# Patient Record
Sex: FEMALE | Race: WHITE | Employment: OTHER | URBAN - METROPOLITAN AREA
[De-identification: names, ages, dates, MRNs, and addresses within clinical notes are randomized per-mention and may not be internally consistent; named-entity substitution may affect disease eponyms.]

---

## 2017-06-19 ENCOUNTER — OFFICE VISIT (OUTPATIENT)
Dept: FAMILY MEDICINE | Facility: CLINIC | Age: 27
End: 2017-06-19
Payer: MEDICARE

## 2017-06-19 VITALS
SYSTOLIC BLOOD PRESSURE: 102 MMHG | TEMPERATURE: 97.5 F | WEIGHT: 157.5 LBS | BODY MASS INDEX: 26.89 KG/M2 | RESPIRATION RATE: 14 BRPM | HEART RATE: 93 BPM | OXYGEN SATURATION: 99 % | DIASTOLIC BLOOD PRESSURE: 62 MMHG | HEIGHT: 64 IN

## 2017-06-19 DIAGNOSIS — J35.1 SWOLLEN TONSIL: ICD-10-CM

## 2017-06-19 DIAGNOSIS — J03.01 ACUTE RECURRENT STREPTOCOCCAL TONSILLITIS: Primary | ICD-10-CM

## 2017-06-19 LAB
DEPRECATED S PYO AG THROAT QL EIA: ABNORMAL
MICRO REPORT STATUS: ABNORMAL
SPECIMEN SOURCE: ABNORMAL

## 2017-06-19 PROCEDURE — 99213 OFFICE O/P EST LOW 20 MIN: CPT | Performed by: FAMILY MEDICINE

## 2017-06-19 PROCEDURE — 87880 STREP A ASSAY W/OPTIC: CPT | Performed by: FAMILY MEDICINE

## 2017-06-19 RX ORDER — ACETAMINOPHEN AND CODEINE PHOSPHATE 300; 30 MG/1; MG/1
1-2 TABLET ORAL EVERY 4 HOURS PRN
Qty: 20 TABLET | Refills: 0 | Status: SHIPPED | OUTPATIENT
Start: 2017-06-19 | End: 2017-07-18

## 2017-06-19 RX ORDER — PENICILLIN V POTASSIUM 500 MG/1
500 TABLET, FILM COATED ORAL 3 TIMES DAILY
Qty: 30 TABLET | Refills: 0 | Status: SHIPPED | OUTPATIENT
Start: 2017-06-19 | End: 2017-07-18

## 2017-06-19 ASSESSMENT — PAIN SCALES - GENERAL: PAINLEVEL: NO PAIN (0)

## 2017-06-19 NOTE — MR AVS SNAPSHOT
"              After Visit Summary   6/19/2017    Sanam Morin    MRN: [id number]           Patient Information     Date Of Birth          1990        Visit Information        Provider Department      6/19/2017 2:30 PM Mayank Ríos MD [facility]        Today's Diagnoses     Acute recurrent streptococcal tonsillitis    -  1    Swollen tonsil           Follow-ups after your visit        Your next 10 appointments already scheduled     Jul 18, 2017  8:40 AM CDT   PHYSICAL with Mayank Ríos MD   [facility] ([facility])    [address] [place] [address] [id number] [phone]              Who to contact     If you have questions or need follow up information about today's clinic visit or your schedule please contact [facility] directly at [phone].  Normal or non-critical lab and imaging results will be communicated to you by MyChart, letter or phone within 4 business days after the clinic has received the results. If you do not hear from us within 7 days, please contact the clinic through MyChart or phone. If you have a critical or abnormal lab result, we will notify you by phone as soon as possible.  Submit refill requests through [employer] or call your pharmacy and they will forward the refill request to us. Please allow 3 business days for your refill to be completed.          Additional Information About Your Visit        MyChart Information     [employer] lets you send messages to your doctor, view your test results, renew your prescriptions, schedule appointments and more. To sign up, go to [email or web address] . Click on \"Log in\" on the left side of the screen, which will take you to the Welcome page. Then click on \"Sign up Now\" on the right side of the page.     You will be asked to enter the access code listed below, as well as some personal information. Please follow the directions to create your username and password.   " "  Your access code is: [email or web address]  Expires: 2017  6:52 PM     Your access code will  in 90 days. If you need help or a new code, please call your [facility] or [phone].        Care EveryWhere ID     This is your Care EveryWhere ID. This could be used by other organizations to access your [facility] medical records  [id number]        Your Vitals Were     Pulse Temperature Respirations Height Last Period Pulse Oximetry    93 97.5  F (36.4  C) (Tympanic) 14 5' 3.6\" (1.615 m) 06/15/2017 (Exact Date) 99%    BMI (Body Mass Index)                   27.38 kg/m2            Blood Pressure from Last 3 Encounters:   17 102/62   16 114/70   10/27/16 130/80    Weight from Last 3 Encounters:   17 157 lb 8 oz (71.4 kg)   16 148 lb (67.1 kg)   10/27/16 150 lb (68 kg)              We Performed the Following     Strep, Rapid Screen          Today's Medication Changes          These changes are accurate as of: 17  6:52 PM.  If you have any questions, ask your nurse or doctor.               Start taking these medicines.        Dose/Directions    acetaminophen-codeine 300-30 MG per tablet   Commonly known as:  TYLENOL/codeine #3   Used for:  Acute recurrent streptococcal tonsillitis   Started by:  Mayank Ríos MD        Dose:  1-2 tablet   Take 1-2 tablets by mouth every 4 hours as needed for mild pain   Quantity:  20 tablet   Refills:  0       penicillin V potassium 500 MG tablet   Commonly known as:  VEETID   Used for:  Acute recurrent streptococcal tonsillitis   Started by:  Mayank Ríos MD        Dose:  500 mg   Take 1 tablet (500 mg) by mouth 3 times daily   Quantity:  30 tablet   Refills:  0            Where to get your medicines      [facility] [address] [place] [address] [address]  [address] [facility] [address]     Phone:  [phone]     penicillin V potassium 500 MG " tablet         Some of these will need a paper prescription and others can be bought over the counter.  Ask your nurse if you have questions.     Bring a paper prescription for each of these medications     acetaminophen-codeine 300-30 MG per tablet                Primary Care Provider Office Phone # Fax #    NELLIE Sage [phone]       [facility]  [facility] [facility] [address] [place] [address]        Thank you!     Thank you for choosing [facility]  for your care. Our goal is always to provide you with excellent care. Hearing back from our patients is one way we can continue to improve our services. Please take a few minutes to complete the written survey that you may receive in the mail after your visit with us. Thank you!             Your Updated Medication List - Protect others around you: Learn how to safely use, store and throw away your medicines at www.disposemymeds.org.          This list is accurate as of: 6/19/17  6:52 PM.  Always use your most recent med list.                   Brand Name Dispense Instructions for use    acetaminophen-codeine 300-30 MG per tablet    TYLENOL/codeine #3    20 tablet    Take 1-2 tablets by mouth every 4 hours as needed for mild pain       LAMICTAL PO      Take 150 mg by mouth daily       penicillin V potassium 500 MG tablet    VEETID    30 tablet    Take 1 tablet (500 mg) by mouth 3 times daily

## 2017-06-19 NOTE — PROGRESS NOTES
SUBJECTIVE: 27 year old female with sore throat, myalgias, swollen glands, headache and fever for 2 days. No history of rheumatic fever. Other symptoms: none.    OBJECTIVE:   Vitals as noted above.  Appears mild distress.  Ears: normal  Oropharynx: tonsillar hypertrophy and marked erythema  Neck: normal, supple and moderate tender anterior cervical nodes  Lungs: chest clear to IPPA and clear to IPPA  Rapid Strep test is positive    ASSESSMENT: Streptococcal pharyngitis    PLAN: Per orders. Gargle, use acetaminophen or other OTC analgesic, and take Rx fully as prescribed. Call if other family members develop similar symptoms. See prn.       Mayank Ríos MD

## 2017-06-19 NOTE — LETTER
My Migraine Action Plan      Date: 6/19/2017     My Name: Sanam Morin   YOB: 1990  My Pharmacy:    [facility] - [place], [place] - [address] [id number] - [place] - [address]       My (Preventative) Control Medicine:         My Rescue Medicine:    My Doctor: Nusrat Rodriguez     My Clinic: [address] [address] [phone]        GREEN ZONE = Good Control    My headache plan is working.   I can do what I need to do.           I WILL:     ? Keep managing my triggers.  ? Write in my migraine diary each time I have a headache.  ? Keep taking my preventive (controller) medicine daily.  ? Take my relief and rescue medicine as needed.             YELLOW ZONE = Not Enough Control    My headache plan isn t always working.   My headaches keep me from doing   some of the things I need to do.       I WILL:     ? Set goals to control my triggers and act on them.  ? Write in my migraine diary each time I have a headache and review it for                      patterns or new triggers.  ? Keep taking my preventive (controller) medicine daily.  ? Take my relief and rescue medicine as needed.  ? Call my doctor or clinic at if I stay in the Yellow Zone.             RED ZONE = Poor or No Control    My headache plan has  failed. I can t do anything  when I have one. My  medicines aren t working.           I WILL:   ? Set goals to control my triggers and act on them.  ? Write in my migraine diary each time I have a headache and review it for                      patterns or new triggers.  ? Keep taking my preventive (controller) medicine daily.  ? Take my relief and rescue medicine as needed.  ? Call my doctor or clinic or go to urgent care or an ER if I m having the worst                  headache of my life.  ? Call my doctor or clinic or go to urgent care or an ER if my medicine  doesn t work.  ? Let my doctor or clinic know within 2 weeks if I have gone to an urgent care or             emergency department.          Provider specific instructions:

## 2017-06-19 NOTE — NURSING NOTE
"Chief Complaint   Patient presents with     Fever     x3d highest 102- only at night     Pharyngitis     x3d-     Mass     x3d swollen glands     Generalized Body Aches     all over body pain sigifredo- pain under rib cage very painful x3d. Feels bruised everywhere       Initial /62 (BP Location: Left arm, Patient Position: Chair, Cuff Size: Adult Regular)  Pulse 93  Temp 97.5  F (36.4  C) (Tympanic)  Resp 14  Ht 5' 3.6\" (1.615 m)  Wt 157 lb 8 oz (71.4 kg)  LMP 06/15/2017 (Exact Date)  SpO2 99%  BMI 27.38 kg/m2 Estimated body mass index is 27.38 kg/(m^2) as calculated from the following:    Height as of this encounter: 5' 3.6\" (1.615 m).    Weight as of this encounter: 157 lb 8 oz (71.4 kg).  Medication Reconciliation: complete   Health Maintenance Due   Topic Date Due     MIGRAINE ACTION PLAN  03/08/2006     PAP Q1 YR DIAGNOSTIC  02/05/2017     Radha Gonzalez, [facility]      "

## 2017-07-18 ENCOUNTER — OFFICE VISIT (OUTPATIENT)
Dept: FAMILY MEDICINE | Facility: CLINIC | Age: 27
End: 2017-07-18
Payer: MEDICARE

## 2017-07-18 VITALS
DIASTOLIC BLOOD PRESSURE: 80 MMHG | SYSTOLIC BLOOD PRESSURE: 126 MMHG | OXYGEN SATURATION: 99 % | BODY MASS INDEX: 27.2 KG/M2 | WEIGHT: 156.5 LBS | HEART RATE: 107 BPM | TEMPERATURE: 97.9 F | RESPIRATION RATE: 20 BRPM

## 2017-07-18 DIAGNOSIS — Z01.419 WELL FEMALE EXAM WITH ROUTINE GYNECOLOGICAL EXAM: Primary | ICD-10-CM

## 2017-07-18 DIAGNOSIS — J35.01 CHRONIC TONSILLITIS: ICD-10-CM

## 2017-07-18 LAB — HETEROPH AB SER QL: NEGATIVE

## 2017-07-18 PROCEDURE — 86308 HETEROPHILE ANTIBODY SCREEN: CPT | Performed by: FAMILY MEDICINE

## 2017-07-18 PROCEDURE — 99395 PREV VISIT EST AGE 18-39: CPT | Performed by: FAMILY MEDICINE

## 2017-07-18 PROCEDURE — G0145 SCR C/V CYTO,THINLAYER,RESCR: HCPCS | Performed by: FAMILY MEDICINE

## 2017-07-18 PROCEDURE — 36415 COLL VENOUS BLD VENIPUNCTURE: CPT | Performed by: FAMILY MEDICINE

## 2017-07-18 PROCEDURE — 99213 OFFICE O/P EST LOW 20 MIN: CPT | Mod: 25 | Performed by: FAMILY MEDICINE

## 2017-07-18 RX ORDER — METHYLPREDNISOLONE 4 MG
4 TABLET, DOSE PACK ORAL SEE ADMIN INSTRUCTIONS
Qty: 21 TABLET | Refills: 0 | Status: SHIPPED | OUTPATIENT
Start: 2017-07-18 | End: 2017-09-22

## 2017-07-18 ASSESSMENT — PAIN SCALES - GENERAL: PAINLEVEL: NO PAIN (0)

## 2017-07-18 NOTE — MR AVS SNAPSHOT
After Visit Summary   7/18/2017    Sanam Morin    MRN: [id number]           Patient Information     Date Of Birth          1990        Visit Information        Provider Department      7/18/2017 8:40 AM Mayank Ríos MD [facility]        Today's Diagnoses     Well female exam with routine gynecological exam    -  1    Chronic tonsillitis          Care Instructions      Preventive Health Recommendations  Female Ages 26 - 39  Yearly exam:   See your health care provider every year in order to    Review health changes.     Discuss preventive care.      Review your medicines if you your doctor has prescribed any.    Until age 30: Get a Pap test every three years (more often if you have had an abnormal result).    After age 30: Talk to your doctor about whether you should have a Pap test every 3 years or have a Pap test with HPV screening every 5 years.   You do not need a Pap test if your uterus was removed (hysterectomy) and you have not had cancer.  You should be tested each year for STDs (sexually transmitted diseases), if you're at risk.   Talk to your provider about how often to have your cholesterol checked.  If you are at risk for diabetes, you should have a diabetes test (fasting glucose).  Shots: Get a flu shot each year. Get a tetanus shot every 10 years.   Nutrition:     Eat at least 5 servings of fruits and vegetables each day.    Eat whole-grain bread, whole-wheat pasta and brown rice instead of white grains and rice.    Talk to your provider about Calcium and Vitamin D.     Lifestyle    Exercise at least 150 minutes a week (30 minutes a day, 5 days of the week). This will help you control your weight and prevent disease.    Limit alcohol to one drink per day.    No smoking.     Wear sunscreen to prevent skin cancer.    See your dentist every six months for an exam and cleaning.            Follow-ups after your visit        Who to contact     If you have  "questions or need follow up information about today's clinic visit or your schedule please contact [facility] directly at [phone].  Normal or non-critical lab and imaging results will be communicated to you by MyChart, letter or phone within 4 business days after the clinic has received the results. If you do not hear from us within 7 days, please contact the clinic through [employer] or phone. If you have a critical or abnormal lab result, we will notify you by phone as soon as possible.  Submit refill requests through [employer] or call your pharmacy and they will forward the refill request to us. Please allow 3 business days for your refill to be completed.          Additional Information About Your Visit        [employer] Information     [employer] lets you send messages to your doctor, view your test results, renew your prescriptions, schedule appointments and more. To sign up, go to [email or web address] . Click on \"Log in\" on the left side of the screen, which will take you to the Welcome page. Then click on \"Sign up Now\" on the right side of the page.     You will be asked to enter the access code listed below, as well as some personal information. Please follow the directions to create your username and password.     Your access code is: [email or web address]  Expires: 2017  6:52 PM     Your access code will  in 90 days. If you need help or a new code, please call your [facility] clinic or [phone].        Care EveryWhere ID     This is your Care EveryWhere ID. This could be used by other organizations to access your [facility] medical records  [id number]        Your Vitals Were     Pulse Temperature Respirations Last Period Pulse Oximetry BMI (Body Mass Index)    107 97.9  F (36.6  C) (Temporal) 20 06/15/2017 (Exact Date) 99% 27.2 kg/m2       Blood Pressure from Last 3 Encounters:   17 126/80   17 102/62   16 114/70    Weight from Last 3 Encounters:   17 156 lb 8 oz " (71 kg)   06/19/17 157 lb 8 oz (71.4 kg)   11/14/16 148 lb (67.1 kg)              We Performed the Following     Mononucleosis screen     Pap imaged thin layer screen reflex to HPV if ASCUS - recommend age 25 - 29          Today's Medication Changes          These changes are accurate as of: 7/18/17  1:58 PM.  If you have any questions, ask your nurse or doctor.               Start taking these medicines.        Dose/Directions    methylPREDNISolone 4 MG tablet   Commonly known as:  MEDROL DOSEPAK   Used for:  Chronic tonsillitis   Started by:  Mayank Ríos MD        Dose:  4 mg   Take 1 tablet (4 mg) by mouth See Admin Instructions   Quantity:  21 tablet   Refills:  0            Where to get your medicines      These medications were sent to [facility] [address] [place] [address] [address] [facility] [address]  [address] [facility] [address]     Phone:  [phone]     methylPREDNISolone 4 MG tablet                Primary Care Provider Office Phone # Fax #    Nusrat Rodriguez, NELLIE [facility] [phone]       [facility]  [facility]   [facility] [address]        Equal Access to Services     JOSE RAHMAN AH: Hadii karrie valderrama hadasho Soomaali, waaxda luqadaha, qaybta kaalmada adeegyada, catherine toussaint. So [facility] [phone].    ATENCIÓN: Si habla español, tiene a erickson disposición servicios gratuitos de asistencia lingüística. Llame al [phone].    We comply with applicable federal civil rights laws and [place] laws. We do not discriminate on the basis of race, color, national origin, age, disability sex, sexual orientation or gender identity.            Thank you!     Thank you for choosing [facility]  for your care. Our goal is always to provide you with excellent care. Hearing back from our patients is one way we can continue to improve our services. Please take a few minutes to complete the written  survey that you may receive in the mail after your visit with us. Thank you!             Your Updated Medication List - Protect others around you: Learn how to safely use, store and throw away your medicines at www.disposemymeds.org.          This list is accurate as of: 7/18/17  1:58 PM.  Always use your most recent med list.                   Brand Name Dispense Instructions for use Diagnosis    LAMICTAL PO      Take 150 mg by mouth daily    Episodic tension-type headache, not intractable       methylPREDNISolone 4 MG tablet    MEDROL DOSEPAK    21 tablet    Take 1 tablet (4 mg) by mouth See Admin Instructions    Chronic tonsillitis

## 2017-07-18 NOTE — LETTER
August 2, 2017      Sanam SEBASTIAN Mikel  [address] [address]   [facility] [address]    Dear ,      I am happy to inform you that your recent cervical cancer screening test (PAP smear) was normal.      Preventative screenings such as this help to ensure your health for years to come. You should repeat a pap smear in 2 years, unless otherwise directed.      You will still need to return to the clinic every year for your annual exam and other preventive tests.     Please contact the clinic at [phone] if you have further questions.       Sincerely,      Mayank Ríos MD/ericka
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
## 2017-07-19 ASSESSMENT — PATIENT HEALTH QUESTIONNAIRE - PHQ9: SUM OF ALL RESPONSES TO PHQ QUESTIONS 1-9: 21

## 2017-07-20 LAB
COPATH REPORT: NORMAL
PAP: NORMAL

## 2017-08-21 ENCOUNTER — TRANSFERRED RECORDS (OUTPATIENT)
Dept: HEALTH INFORMATION MANAGEMENT | Facility: CLINIC | Age: 27
End: 2017-08-21

## 2017-09-22 ENCOUNTER — OFFICE VISIT (OUTPATIENT)
Dept: URGENT CARE | Facility: RETAIL CLINIC | Age: 27
End: 2017-09-22
Payer: MEDICARE

## 2017-09-22 VITALS — DIASTOLIC BLOOD PRESSURE: 66 MMHG | SYSTOLIC BLOOD PRESSURE: 108 MMHG | HEART RATE: 85 BPM | TEMPERATURE: 97.9 F

## 2017-09-22 DIAGNOSIS — R30.0 DYSURIA: Primary | ICD-10-CM

## 2017-09-22 DIAGNOSIS — N39.0 ACUTE LOWER UTI: ICD-10-CM

## 2017-09-22 PROCEDURE — 99203 OFFICE O/P NEW LOW 30 MIN: CPT | Performed by: PHYSICIAN ASSISTANT

## 2017-09-22 PROCEDURE — 87088 URINE BACTERIA CULTURE: CPT | Performed by: PHYSICIAN ASSISTANT

## 2017-09-22 PROCEDURE — 87186 SC STD MICRODIL/AGAR DIL: CPT | Performed by: PHYSICIAN ASSISTANT

## 2017-09-22 PROCEDURE — 87086 URINE CULTURE/COLONY COUNT: CPT | Performed by: PHYSICIAN ASSISTANT

## 2017-09-22 RX ORDER — SULFAMETHOXAZOLE/TRIMETHOPRIM 800-160 MG
1 TABLET ORAL 2 TIMES DAILY
Qty: 6 TABLET | Refills: 0 | Status: SHIPPED | OUTPATIENT
Start: 2017-09-22 | End: 2017-09-25

## 2017-09-22 RX ORDER — HYDROXYZINE HYDROCHLORIDE 25 MG/1
TABLET, FILM COATED ORAL
Refills: 5 | COMMUNITY
Start: 2017-05-22 | End: 2018-10-03

## 2017-09-22 NOTE — MR AVS SNAPSHOT
"              After Visit Summary   9/22/2017    Sanam Morin    MRN: [id number]           Patient Information     Date Of Birth          1990        Visit Information        Provider Department      9/22/2017 9:30 AM Emilia Gutierrez [facility]-C [facility] [place]        Today's Diagnoses     Dysuria    -  1    Acute lower UTI          Care Instructions    Take full course of antibiotics- Bactrim twice daily for 3 days.  Urine culture pending, we will call you only if culture shows resistance and change of antibiotic is required or if no growth to stop antibiotics and to follow-up with your primary care provider.  May use over the counter Azo pain relief or Uristat for urinary burning but do not use for 24 hours before future urine tests.  Drink plenty of fluids. Limit caffeine and alcohol as these are bladder irritants.  May take tylenol or ibuprofen as needed for discomfort.   If you develop any vomiting, high fevers or lower back pain, these can be signs of a kidney infection and you should be seen in urgent care or in the ER.  Prevention of future infections by drinking cranberry juice, urination after intercourse and wiping from front to back after using the toilet.  Please follow up with primary care provider if symptoms return, if you're not improving, worsening or new symptoms or for any adverse reactions to medications.           Follow-ups after your visit        Who to contact     You can reach your care team any time of the day by calling [phone].  Notification of test results:  If you have an abnormal lab result, we will notify you by phone as soon as possible.         Additional Information About Your Visit        MyChart Information     Hyper9t lets you send messages to your doctor, view your test results, renew your prescriptions, schedule appointments and more. To sign up, go to [email or web address] . Click on \"Log in\" on the left side of the screen, which will take you " "to the Welcome page. Then click on \"Sign up Now\" on the right side of the page.     You will be asked to enter the access code listed below, as well as some personal information. Please follow the directions to create your username and password.     Your access code is: [email or web address]  Expires: 2017  9:57 AM     Your access code will  in 90 days. If you need help or a new code, please call your [facility] clinic or [phone].        Care EveryWhere ID     This is your Care EveryWhere ID. This could be used by other organizations to access your [facility] medical records  [id number]        Your Vitals Were     Pulse Temperature Last Period             85 97.9  F (36.6  C) (Oral) 2017          Blood Pressure from Last 3 Encounters:   17 108/66   17 126/80   17 102/62    Weight from Last 3 Encounters:   17 156 lb 8 oz (71 kg)   17 157 lb 8 oz (71.4 kg)   16 148 lb (67.1 kg)              We Performed the Following     BETA STREP GROUP A R/O CULTURE     RAPID STREP SCREEN          Today's Medication Changes          These changes are accurate as of: 17  9:57 AM.  If you have any questions, ask your nurse or doctor.               Start taking these medicines.        Dose/Directions    sulfamethoxazole-trimethoprim 800-160 MG per tablet   Commonly known as:  BACTRIM DS/SEPTRA DS   Used for:  Acute lower UTI        Dose:  1 tablet   Take 1 tablet by mouth 2 times daily for 3 days   Quantity:  6 tablet   Refills:  0            Where to get your medicines      These medications were sent to [facility] - [place], [place] - [address]  [address] [address]    Hours:  Formerly [place] - vic unchanged   03  Phone:  [phone]     sulfamethoxazole-trimethoprim 800-160 MG per tablet                Primary Care Provider Office Phone # Fax #    NELLIE Sage [phone]       [address] [place] [address]      "   Equal Access to Services     [facility]ium[facility]UL[facility] : Hadii aad ku hadviniciotaty Dennisetammy, wasuzanda luqadaha, qaybta kawestalejandra cortez, catherine toussaint. So [facility] [phone].    ATENCIÓN: Si habla español, tiene a erickson disposición servicios gratuitos de asistencia lingüística. Llame al [phone].    We comply with applicable federal civil rights laws and [place] laws. We do not discriminate on the basis of race, color, national origin, age, disability sex, sexual orientation or gender identity.            Thank you!     Thank you for choosing [facility]  for your care. Our goal is always to provide you with excellent care. Hearing back from our patients is one way we can continue to improve our services. Please take a few minutes to complete the written survey that you may receive in the mail after your visit with us. Thank you!             Your Updated Medication List - Protect others around you: Learn how to safely use, store and throw away your medicines at www.disposemymeds.org.          This list is accurate as of: 9/22/17  9:57 AM.  Always use your most recent med list.                   Brand Name Dispense Instructions for use Diagnosis    hydrOXYzine 25 MG tablet    ATARAX     TK 1 T PO TID PRN        LAMICTAL PO      Take 150 mg by mouth daily    Episodic tension-type headache, not intractable       methylPREDNISolone 4 MG tablet    MEDROL DOSEPAK    21 tablet    Take 1 tablet (4 mg) by mouth See Admin Instructions    Chronic tonsillitis       sulfamethoxazole-trimethoprim 800-160 MG per tablet    BACTRIM DS/SEPTRA DS    6 tablet    Take 1 tablet by mouth 2 times daily for 3 days    Acute lower UTI

## 2017-09-22 NOTE — PATIENT INSTRUCTIONS
Take full course of antibiotics- Bactrim twice daily for 3 days.  Urine culture pending, we will call you only if culture shows resistance and change of antibiotic is required or if no growth to stop antibiotics and to follow-up with your primary care provider.  May use over the counter Azo pain relief or Uristat for urinary burning but do not use for 24 hours before future urine tests.  Drink plenty of fluids. Limit caffeine and alcohol as these are bladder irritants.  May take tylenol or ibuprofen as needed for discomfort.   If you develop any vomiting, high fevers or lower back pain, these can be signs of a kidney infection and you should be seen in urgent care or in the ER.  Prevention of future infections by drinking cranberry juice, urination after intercourse and wiping from front to back after using the toilet.  Please follow up with primary care provider if symptoms return, if you're not improving, worsening or new symptoms or for any adverse reactions to medications.

## 2017-09-22 NOTE — PROGRESS NOTES
Chief Complaint   Patient presents with     UTI     4 days; woke up with symptoms Monday; Azo last night; pressure     SUBJECTIVE:   Sanam Morin is a 27 year old female who  presents today for a possible UTI.   She has symptoms of suprapubic pressure, dysuria, urgency and frequency that have been going on for 5 days.    Symptom timing described as gradual onset and moderate in severity.    This patient does have a history of urinary tract infections.  There is no history of hematuria, flank pain, fever, chills, nausea or vomiting.   Patient denies vaginal discharge, vaginal odor and vaginal itching   She took AZO pain relief last night which relieved her symptoms.  LMP: about Monday Sept 4, 2017.    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     Moderate major depression (H) 6/28/2011     Pap smear of cervix with ASCUS, cannot exclude HGSIL 8/20/13     Postpartum depression     major post partum depression.      S/P LASIK surgery      Current Outpatient Prescriptions   Medication Sig Dispense Refill     sulfamethoxazole-trimethoprim (BACTRIM DS/SEPTRA DS) 800-160 MG per tablet Take 1 tablet by mouth 2 times daily for 3 days 6 tablet 0     LamoTRIgine (LAMICTAL PO) Take 150 mg by mouth daily       hydrOXYzine (ATARAX) 25 MG tablet TK 1 T PO TID PRN  5     Social History   Substance Use Topics     Smoking status: Current Every Day Smoker     Packs/day: 1.00     Years: 11.00     Types: Cigarettes     Smokeless tobacco: Never Used     Alcohol use 0.6 - 1.2 oz/week     1 - 2 Glasses of wine per week     Allergies   Allergen Reactions     Moxifloxacin      diahreaa     Percocet [Oxycodone-Acetaminophen] GI Disturbance     Wellbutrin [Bupropion Hcl] Other (See Comments)     Suicidal thoughts     Yellow Dye      Number 5  Hives and diahreaa     Accutane  [Isotretinoin] Other (See Comments)     Suicidal thoughts  Suicidal ideation     Spironolactone Rash     ROS:   Review of systems negative except as stated above.    OBJECTIVE:  /66 (BP Location: Left arm)  Pulse 85  Temp 97.9  F (36.6  C) (Oral)  LMP 09/04/2017  GENERAL APPEARANCE: healthy, alert and no distress  RESP: lungs clear to auscultation - no rales, rhonchi or wheezes  CV: regular rates and rhythm, normal S1 S2, no murmur noted  ABDOMEN:  soft, mild suprapubic tenderness  BACK: No CVA tenderness    UA: not done because she has recently taken AZO pain relief and urine is bright orange    ASSESSMENT:    ICD-10-CM    1. Dysuria R30.0 Urine Culture Aerobic Bacterial     CANCELED: RAPID STREP SCREEN     CANCELED: BETA STREP GROUP A R/O CULTURE   2. Acute lower UTI N39.0 sulfamethoxazole-trimethoprim (BACTRIM DS/SEPTRA DS) 800-160 MG per tablet     PLAN:   Patient Instructions   Take full course of antibiotics- Bactrim twice daily for 3 days.  Urine culture pending, we will call you only if culture shows resistance and change of antibiotic is required or if no growth to stop antibiotics and to follow-up with your primary care provider.  May use over the counter Azo pain relief or Uristat for urinary burning but do not use for 24 hours before future urine tests.  Drink plenty of fluids. Limit caffeine and alcohol as these are bladder irritants.  May take tylenol or ibuprofen as needed for discomfort.   If you develop any vomiting, high fevers or lower back pain, these can be signs of a kidney infection and you should be seen in urgent care or in the ER.  Prevention of future infections by drinking cranberry juice, urination after intercourse and wiping from front to back after using the toilet.  Please follow up with primary care provider if symptoms return, if you're not improving, worsening or new symptoms or for any adverse reactions to medications.     Follow up with primary care provider with any  problems, questions or concerns or if symptoms worsen or fail to improve. Patient agreed to plan and verbalized understanding.    Ashleigh Gutierrez PA-C  [facility]

## 2017-09-22 NOTE — NURSING NOTE
"Chief Complaint   Patient presents with     UTI     4 days; woke up with symptoms Monday; Azo last night; pressure       Initial /66 (BP Location: Left arm)  Pulse 85  Temp 97.9  F (36.6  C) (Oral)  LMP 09/04/2017 Estimated body mass index is 27.2 kg/(m^2) as calculated from the following:    Height as of 6/19/17: 5' 3.6\" (1.615 m).    Weight as of 7/18/17: 156 lb 8 oz (71 kg).  Medication Reconciliation: complete  "

## 2017-09-23 NOTE — PROGRESS NOTES
Urine culture positive, preliminarily with 50,000-100,000 colonies/mL of lactose fermenting gram neg rods. Patient currently taking Bactrim. No change at this time. Awaiting final sensitivities. -Ashleigh Gutierrez PA-C

## 2017-09-24 LAB
BACTERIA SPEC CULT: ABNORMAL
SPECIMEN SOURCE: ABNORMAL

## 2017-09-24 NOTE — PROGRESS NOTES
Urine culture positive with 50,000-100,000 colonies/mL of E. coli. Bacteria is susceptible to Bactrim and patient is currently taking this. No change necessary. -Ashleigh Gutierrez PA-C

## 2017-09-26 ENCOUNTER — OFFICE VISIT (OUTPATIENT)
Dept: FAMILY MEDICINE | Facility: OTHER | Age: 27
End: 2017-09-26
Payer: MEDICARE

## 2017-09-26 VITALS
BODY MASS INDEX: 28.31 KG/M2 | DIASTOLIC BLOOD PRESSURE: 58 MMHG | RESPIRATION RATE: 12 BRPM | HEART RATE: 68 BPM | TEMPERATURE: 97.4 F | WEIGHT: 162.9 LBS | SYSTOLIC BLOOD PRESSURE: 100 MMHG | OXYGEN SATURATION: 100 %

## 2017-09-26 DIAGNOSIS — L05.91 CYST NEAR COCCYX: ICD-10-CM

## 2017-09-26 DIAGNOSIS — M53.3 PAIN IN THE COCCYX: Primary | ICD-10-CM

## 2017-09-26 PROCEDURE — 99213 OFFICE O/P EST LOW 20 MIN: CPT | Performed by: PHYSICIAN ASSISTANT

## 2017-09-26 ASSESSMENT — PAIN SCALES - GENERAL: PAINLEVEL: MILD PAIN (3)

## 2017-09-26 NOTE — MR AVS SNAPSHOT
After Visit Summary   9/26/2017    Sanam Morin    MRN: [id number]           Patient Information     Date Of Birth          1990        Visit Information        Provider Department      9/26/2017 8:20 AM Silvana Salcedo PA-C [facility]        Today's Diagnoses     Pain in the coccyx    -  1    Cyst near coccyx          Care Instructions    There is no evidence of acute redness or swelling to your skin, I will have you do sitz baths and if increased swelling or pain develop follow up with surgery for further evaluation and treatment.       Pilonidal Cyst    A pilonidal cyst is found near the base of the spine (tailbone) or top of the buttocks crease. It may look like a pit or small depression. In some cases, it may have a hollow tunnel (sinus tract) that connects it to the surface of the skin. Normally, a pilonidal cyst does not cause symptoms. But if it becomes infected, it can cause pain and swelling. This sheet tells you more about pilonidal cysts and how they are treated.  What causes a pilonidal cyst and who gets them?  Two main causes are:    Ingrown hairs. This happens when a hair is forced under the skin or when a hair follicle ruptures.    Injury to the area. This can happen from sitting for long periods of time.  These cysts are often diagnosed in people between ages 16 and 26. But people of any age can have a pilonidal cyst. They affect both men and women, but they are more common in men.  Symptoms of a pilonidal cyst infection  A pilonidal cyst does not cause symptoms unless it becomes infected. Once a pilonidal cyst becomes infected, it is called a pilonidal abscess. Infection may cause the following symptoms:    Pain, redness, and swelling of the cyst and area around it    Foul-smelling drainage from the cyst    Fever  Diagnosing a pilonidal cyst  A pilonidal cyst can be diagnosed by how it looks and by its location. Your healthcare provider will examine the  suspected cyst to confirm a diagnosis. You will be told if any tests are needed.  Treating a pilonidal cyst infection  Most pilonidal cysts are left alone. But if a cyst becomes infected, treatment is needed. It may include the following:    Incision and drainage. If needed, the cyst is cut open, and pus and other infected material is allowed to drain.    Antibiotic medicines for the infection. Know that medicines do not make the cyst go away, and antibiotics have limited use in treating an abscess. They also won t keep a cyst from becoming infected again.    Hot water soaks. These can help draw out the infection and ease pain and itching.    Surgery to remove the cyst (excision). This may be done if the infection is severe, does not respond to medicine, or keeps coming back. A surgeon cuts and removes the cyst and the tissue around it. Your healthcare provider can tell you more if this is needed.    Laser hair removalaround the area. This may decrease the frequency of flare-ups.  Preventing infection  A pilonidal cyst can easily become infected. Do the following to help prevent infections:    Keep the cyst and surrounding skin area clean.    Remove hair from the area of the cyst regularly. Ask your healthcare provider about safe hair removal products or procedures.    Avoid sitting in one position for long periods of time. This helps to reduce weight and pressure on your tailbone area. Sitting on a special cushion to relieve pressure on the tailbone may also help. Ask your healthcare provider about where to purchase these cushions.    Avoid tight-fitting clothing to reduce skin irritation around the cyst.  Date Last Reviewed: 2/1/2017 2000-2017 [employer] [address] [place] [address], [place] [address]. All rights reserved. This information is not intended as a substitute for professional medical care. Always follow your healthcare professional's instructions.                Follow-ups after your  visit        Additional Services     GENERAL SURG ADULT REFERRAL       Your provider has referred you to: [facility]: [facility] [phone]   [email or web address]  [facility]: [facility] [phone]   [email or web address]    Please be aware that coverage of these services is subject to the terms and limitations of your health insurance plan.  Call member services at your health plan with any benefit or coverage questions.      Please bring the following with you to your appointment:    (1) Any X-Rays, CTs or MRIs which have been performed.  Contact the facility where they were done to arrange for  prior to your scheduled appointment.   (2) List of current medications   (3) This referral request   (4) Any documents/labs given to you for this referral                  Follow-up notes from your care team     Return if symptoms worsen or fail to improve.      Who to contact     If you have questions or need follow up information about today's clinic visit or your schedule please contact [facility] directly at [phone].  Normal or non-critical lab and imaging results will be communicated to you by MyChart, letter or phone within 4 business days after the clinic has received the results. If you do not hear from us within 7 days, please contact the clinic through MyChart or phone. If you have a critical or abnormal lab result, we will notify you by phone as soon as possible.  Submit refill requests through sougou or call your pharmacy and they will forward the refill request to us. Please allow 3 business days for your refill to be completed.          Additional Information About Your Visit        sougou Information     sougou lets you send messages to your doctor, view your test results, renew your prescriptions, schedule appointments and more. To sign up, go to  "[email or web address] . Click on \"Log in\" on the left side of the screen, which will take you to the Welcome page. Then click on \"Sign up Now\" on the right side of the page.     You will be asked to enter the access code listed below, as well as some personal information. Please follow the directions to create your username and password.     Your access code is: [email or web address]  Expires: 2017  9:57 AM     Your access code will  in 90 days. If you need help or a new code, please call your [facility] clinic or [phone].        Care EveryWhere ID     This is your Care EveryWhere ID. This could be used by other organizations to access your [facility] medical records  [id number]        Your Vitals Were     Pulse Temperature Respirations Last Period Pulse Oximetry BMI (Body Mass Index)    68 97.4  F (36.3  C) (Oral) 12 2017 100% 28.31 kg/m2       Blood Pressure from Last 3 Encounters:   17 100/58   17 108/66   17 126/80    Weight from Last 3 Encounters:   17 162 lb 14.4 oz (73.9 kg)   17 156 lb 8 oz (71 kg)   17 157 lb 8 oz (71.4 kg)              We Performed the Following     GENERAL SURG ADULT REFERRAL        Primary Care Provider Office Phone # Fax #    NELLIE Sage [facility] [phone]       [address] [place] [address]        Equal Access to Services     JOSE RAHMAN : Hadbinta green Sotammy, waaxda luqadaha, qaybta kaalmada adeegyaalejandra, catherine toussaint. So [facility] [phone].    ATENCIÓN: Si habla español, tiene a erickson disposición servicios gratuitos de asistencia lingüística. Llame al [phone].    We comply with applicable federal civil rights laws and [place] laws. We do not discriminate on the basis of race, color, national origin, age, disability sex, sexual orientation or gender identity.            Thank you!     Thank you for choosing [facility]  for your care. Our goal is always " to provide you with excellent care. Hearing back from our patients is one way we can continue to improve our services. Please take a few minutes to complete the written survey that you may receive in the mail after your visit with us. Thank you!             Your Updated Medication List - Protect others around you: Learn how to safely use, store and throw away your medicines at www.disposemymeds.org.          This list is accurate as of: 9/26/17  8:49 AM.  Always use your most recent med list.                   Brand Name Dispense Instructions for use Diagnosis    hydrOXYzine 25 MG tablet    ATARAX     TK 1 T PO TID PRN        LAMICTAL PO      Take 150 mg by mouth daily    Episodic tension-type headache, not intractable

## 2017-09-26 NOTE — NURSING NOTE
"Chief Complaint   Patient presents with     Mass     check lump on buttocks,x 4 days       Initial /58 (BP Location: Left arm, Patient Position: Chair, Cuff Size: Adult Regular)  Pulse 68  Temp 97.4  F (36.3  C) (Oral)  Resp 12  Wt 162 lb 14.4 oz (73.9 kg)  LMP 09/04/2017  SpO2 100%  BMI 28.31 kg/m2 Estimated body mass index is 28.31 kg/(m^2) as calculated from the following:    Height as of 6/19/17: 5' 3.6\" (1.615 m).    Weight as of this encounter: 162 lb 14.4 oz (73.9 kg).  Medication Reconciliation: complete       Linda BOJORQUEZ LPN      "

## 2017-09-26 NOTE — PATIENT INSTRUCTIONS
There is no evidence of acute redness or swelling to your skin, I will have you do sitz baths and if increased swelling or pain develop follow up with surgery for further evaluation and treatment.       Pilonidal Cyst    A pilonidal cyst is found near the base of the spine (tailbone) or top of the buttocks crease. It may look like a pit or small depression. In some cases, it may have a hollow tunnel (sinus tract) that connects it to the surface of the skin. Normally, a pilonidal cyst does not cause symptoms. But if it becomes infected, it can cause pain and swelling. This sheet tells you more about pilonidal cysts and how they are treated.  What causes a pilonidal cyst and who gets them?  Two main causes are:    Ingrown hairs. This happens when a hair is forced under the skin or when a hair follicle ruptures.    Injury to the area. This can happen from sitting for long periods of time.  These cysts are often diagnosed in people between ages 16 and 26. But people of any age can have a pilonidal cyst. They affect both men and women, but they are more common in men.  Symptoms of a pilonidal cyst infection  A pilonidal cyst does not cause symptoms unless it becomes infected. Once a pilonidal cyst becomes infected, it is called a pilonidal abscess. Infection may cause the following symptoms:    Pain, redness, and swelling of the cyst and area around it    Foul-smelling drainage from the cyst    Fever  Diagnosing a pilonidal cyst  A pilonidal cyst can be diagnosed by how it looks and by its location. Your healthcare provider will examine the suspected cyst to confirm a diagnosis. You will be told if any tests are needed.  Treating a pilonidal cyst infection  Most pilonidal cysts are left alone. But if a cyst becomes infected, treatment is needed. It may include the following:    Incision and drainage. If needed, the cyst is cut open, and pus and other infected material is allowed to drain.    Antibiotic medicines for the  infection. Know that medicines do not make the cyst go away, and antibiotics have limited use in treating an abscess. They also won t keep a cyst from becoming infected again.    Hot water soaks. These can help draw out the infection and ease pain and itching.    Surgery to remove the cyst (excision). This may be done if the infection is severe, does not respond to medicine, or keeps coming back. A surgeon cuts and removes the cyst and the tissue around it. Your healthcare provider can tell you more if this is needed.    Laser hair removalaround the area. This may decrease the frequency of flare-ups.  Preventing infection  A pilonidal cyst can easily become infected. Do the following to help prevent infections:    Keep the cyst and surrounding skin area clean.    Remove hair from the area of the cyst regularly. Ask your healthcare provider about safe hair removal products or procedures.    Avoid sitting in one position for long periods of time. This helps to reduce weight and pressure on your tailbone area. Sitting on a special cushion to relieve pressure on the tailbone may also help. Ask your healthcare provider about where to purchase these cushions.    Avoid tight-fitting clothing to reduce skin irritation around the cyst.  Date Last Reviewed: 2/1/2017 2000-2017 [employer]. [address] [place] [address], [place] [address]. All rights reserved. This information is not intended as a substitute for professional medical care. Always follow your healthcare professional's instructions.

## 2017-10-11 NOTE — NURSING NOTE
"Chief Complaint   Patient presents with     Physical       Initial /80 (BP Location: Right arm, Patient Position: Chair, Cuff Size: Adult Regular)  Pulse 107  Temp 97.9  F (36.6  C) (Temporal)  Resp 20  Wt 156 lb 8 oz (71 kg)  LMP 06/15/2017 (Exact Date)  SpO2 99%  BMI 27.2 kg/m2 Estimated body mass index is 27.2 kg/(m^2) as calculated from the following:    Height as of 6/19/17: 5' 3.6\" (1.615 m).    Weight as of this encounter: 156 lb 8 oz (71 kg).  Medication Reconciliation: complete   Mariama Rodrigues CMA     " Patient not seen here since 8/2012.  Please advise on refill request.

## 2017-10-26 ENCOUNTER — OFFICE VISIT (OUTPATIENT)
Dept: URGENT CARE | Facility: RETAIL CLINIC | Age: 27
End: 2017-10-26
Payer: MEDICARE

## 2017-10-26 VITALS — HEART RATE: 80 BPM | TEMPERATURE: 99.2 F | DIASTOLIC BLOOD PRESSURE: 68 MMHG | SYSTOLIC BLOOD PRESSURE: 95 MMHG

## 2017-10-26 DIAGNOSIS — J02.9 ACUTE PHARYNGITIS, UNSPECIFIED ETIOLOGY: Primary | ICD-10-CM

## 2017-10-26 LAB — S PYO AG THROAT QL IA.RAPID: NORMAL

## 2017-10-26 PROCEDURE — 99213 OFFICE O/P EST LOW 20 MIN: CPT | Performed by: PHYSICIAN ASSISTANT

## 2017-10-26 PROCEDURE — 87081 CULTURE SCREEN ONLY: CPT | Performed by: PHYSICIAN ASSISTANT

## 2017-10-26 PROCEDURE — 87880 STREP A ASSAY W/OPTIC: CPT | Performed by: PHYSICIAN ASSISTANT

## 2017-10-26 NOTE — NURSING NOTE
"Chief Complaint   Patient presents with     Pharyngitis     since tuesday, low grade fever this am of 98.6 per patient     Otalgia     both ears felt watery tuesday, bilateral ear pain since yesterday       Initial BP 95/68 (BP Location: Left arm)  Pulse 80  Temp 99.2  F (37.3  C) (Temporal) Estimated body mass index is 28.31 kg/(m^2) as calculated from the following:    Height as of 6/19/17: 5' 3.6\" (1.615 m).    Weight as of 9/26/17: 162 lb 14.4 oz (73.9 kg).  Medication Reconciliation: complete    "

## 2017-10-26 NOTE — LETTER
[facility]  [address] [address]      October 26, 2017    Sanam SEBASTIAN Mikel  [address]   [facility] [address]        To whom it may concern:    Sanam was seen at our clinic today for an acute illness. Please excuse her from work today.        Sincerely,        Emilia Gutierrez PA-C

## 2017-10-26 NOTE — MR AVS SNAPSHOT
"              After Visit Summary   10/26/2017    Sanam Morin    MRN: [id number]           Patient Information     Date Of Birth          1990        Visit Information        Provider Department      10/26/2017 9:50 AM Emilia Gutierrez PA-C [facility]        Today's Diagnoses     Acute pharyngitis, unspecified etiology    -  1      Care Instructions    Rapid strep test today is negative.   Your throat culture is pending. [facility] will call if positive results to start antibiotics at that time; No call if the culture is negative.  Drink plenty of fluids and rest.  May use salt water gargles- about 8 oz warm water with about 1 teaspoon salt  Sucrets and Cepacol spray are over the counter medications that numb the throat.  Over the counter pain relievers such as tylenol or ibuprofen may be used as needed.   Honey lemon tea helps to soothe the throat. \"Throat Coat\" tea is soothing as well.  Please follow up with primary care provider if not improving, worsening or new symptoms.          Follow-ups after your visit        Who to contact     You can reach your care team any time of the day by calling [phone].  Notification of test results:  If you have an abnormal lab result, we will notify you by phone as soon as possible.         Additional Information About Your Visit        MyChart Information     [employer] lets you send messages to your doctor, view your test results, renew your prescriptions, schedule appointments and more. To sign up, go to [email or web address] . Click on \"Log in\" on the left side of the screen, which will take you to the Welcome page. Then click on \"Sign up Now\" on the right side of the page.     You will be asked to enter the access code listed below, as well as some personal information. Please follow the directions to create your username and password.     Your access code is: [email or web address]  Expires: 12/21/2017  9:57 AM     Your access code will "  in 90 days. If you need help or a new code, please call your [facility] clinic or [phone].        Care EveryWhere ID     This is your Care EveryWhere ID. This could be used by other organizations to access your [facility] medical records  [id number]        Your Vitals Were     Pulse Temperature                80 99.2  F (37.3  C) (Temporal)           Blood Pressure from Last 3 Encounters:   10/26/17 95/68   17 100/58   17 108/66    Weight from Last 3 Encounters:   17 162 lb 14.4 oz (73.9 kg)   17 156 lb 8 oz (71 kg)   17 157 lb 8 oz (71.4 kg)              We Performed the Following     BETA STREP GROUP A R/O CULTURE     RAPID STREP SCREEN        Primary Care Provider Office Phone # Fax #    Nusrat NELLIE Rodriguez [facility] [phone] [address] [place] [address]        Equal Access to Services     [facility]: Hadii aad ku hadasho Soomaali, waaxda luqadaha, qaybta kaalmada adeegyada, waxay idiin hayaan samanthaeg annettearakim james . So [facility] [phone].    ATENCIÓN: Si habla español, tiene a erickson disposición servicios gratuitos de asistencia lingüística. Llame al [phone].    We comply with applicable federal civil rights laws and [place] laws. We do not discriminate on the basis of race, color, national origin, age, disability, sex, sexual orientation, or gender identity.            Thank you!     Thank you for choosing [facility]  for your care. Our goal is always to provide you with excellent care. Hearing back from our patients is one way we can continue to improve our services. Please take a few minutes to complete the written survey that you may receive in the mail after your visit with us. Thank you!             Your Updated Medication List - Protect others around you: Learn how to safely use, store and throw away your medicines at www.disposemymeds.org.          This list is accurate as of: 10/26/17 10:26 AM.  Always use  your most recent med list.                   Brand Name Dispense Instructions for use Diagnosis    ALEVE PO           hydrOXYzine 25 MG tablet    ATARAX     TK 1 T PO TID PRN        LAMICTAL PO      Take 150 mg by mouth daily    Episodic tension-type headache, not intractable

## 2017-10-26 NOTE — PROGRESS NOTES
"Chief Complaint   Patient presents with     Pharyngitis     since tuesday, low grade fever this am of 98.6 per patient     Otalgia     both ears felt watery tuesday, bilateral ear pain since yesterday     SUBJECTIVE:  Sanam Morin is a 27 year old female presenting with a chief complaint of a sore throat.  Onset of symptoms was 2 days ago.  Course of illness: sudden onset.  Severity: mild and moderate  Current and Associated symptoms: bilateral ear fullness and pressure with a \"watery\" feeling, temp 99F  Treatment measures tried include: None tried.  Predisposing factors include: daughter not feeling well either- diagnosed with strep 9 days ago, on amoxicillin but still symptomatic.    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     Moderate major depression (H) 6/28/2011     Pap smear of cervix with ASCUS, cannot exclude HGSIL 8/20/13     Postpartum depression     major post partum depression.      S/P LASIK surgery      Current Outpatient Prescriptions   Medication Sig Dispense Refill     Naproxen Sodium (ALEVE PO)        LamoTRIgine (LAMICTAL PO) Take 150 mg by mouth daily       hydrOXYzine (ATARAX) 25 MG tablet TK 1 T PO TID PRN  5     Social History   Substance Use Topics     Smoking status: Current Every Day Smoker     Packs/day: 1.00     Years: 11.00     Types: Cigarettes     Smokeless tobacco: Never Used     Alcohol use 0.6 - 1.2 oz/week     1 - 2 Glasses of wine per week     Allergies   Allergen Reactions     Moxifloxacin      diahreaa     Percocet [Oxycodone-Acetaminophen] GI Disturbance     Wellbutrin [Bupropion Hcl] Other (See Comments)     Suicidal thoughts     Yellow Dye      Number 5  Hives and diahreaa     Accutane [Isotretinoin] Other (See Comments)     Suicidal thoughts  Suicidal ideation     Spironolactone Rash     ROS:  Review of " "systems negative except as stated above.    OBJECTIVE:   BP 95/68 (BP Location: Left arm)  Pulse 80  Temp 99.2  F (37.3  C) (Temporal)  GENERAL APPEARANCE: healthy, alert and in no distress  HEENT: Eyes PEERL, conjunctiva clear. Bilateral ear canals and TMs normal. Nose normal. Pharynx erythematous without tonsillar hypertrophy or exudate noted.  NECK: supple, non-tender to palpation, no adenopathy noted  RESP: lungs clear to auscultation - no rales, rhonchi or wheezes  CV: regular rates and rhythm, normal S1 S2, no murmur noted  SKIN: no suspicious lesions or rashes    Rapid Strep test is negative; await throat culture results.    ASSESSMENT:    ICD-10-CM    1. Acute pharyngitis, unspecified etiology J02.9 RAPID STREP SCREEN     BETA STREP GROUP A R/O CULTURE     PLAN:   Patient Instructions   Rapid strep test today is negative.   Your throat culture is pending. [facility] will call if positive results to start antibiotics at that time; No call if the culture is negative.  Drink plenty of fluids and rest.  May use salt water gargles- about 8 oz warm water with about 1 teaspoon salt  Sucrets and Cepacol spray are over the counter medications that numb the throat.  Over the counter pain relievers such as tylenol or ibuprofen may be used as needed.   Honey lemon tea helps to soothe the throat. \"Throat Coat\" tea is soothing as well.  Please follow up with primary care provider if not improving, worsening or new symptoms.    Follow up with primary care provider with any problems, questions or concerns or if symptoms worsen or fail to improve. Patient agreed to plan and verbalized understanding.    Ashleigh Gutierrez PA-C  [facility]  "

## 2017-10-28 LAB — BETA STREP CONFIRM: NORMAL

## 2018-04-04 ENCOUNTER — OFFICE VISIT (OUTPATIENT)
Dept: URGENT CARE | Facility: RETAIL CLINIC | Age: 28
End: 2018-04-04
Payer: MEDICARE

## 2018-04-04 VITALS
SYSTOLIC BLOOD PRESSURE: 124 MMHG | TEMPERATURE: 99 F | DIASTOLIC BLOOD PRESSURE: 77 MMHG | OXYGEN SATURATION: 97 % | HEART RATE: 91 BPM

## 2018-04-04 DIAGNOSIS — J02.9 ACUTE PHARYNGITIS, UNSPECIFIED ETIOLOGY: ICD-10-CM

## 2018-04-04 DIAGNOSIS — F17.200 TOBACCO USE DISORDER: ICD-10-CM

## 2018-04-04 DIAGNOSIS — J06.9 UPPER RESPIRATORY TRACT INFECTION, UNSPECIFIED TYPE: Primary | ICD-10-CM

## 2018-04-04 LAB — S PYO AG THROAT QL IA.RAPID: NORMAL

## 2018-04-04 PROCEDURE — 87081 CULTURE SCREEN ONLY: CPT | Performed by: PHYSICIAN ASSISTANT

## 2018-04-04 PROCEDURE — 87880 STREP A ASSAY W/OPTIC: CPT | Mod: QW | Performed by: PHYSICIAN ASSISTANT

## 2018-04-04 PROCEDURE — 99213 OFFICE O/P EST LOW 20 MIN: CPT | Performed by: PHYSICIAN ASSISTANT

## 2018-04-04 RX ORDER — SILICONE ADHESIVE 1.5" X 3"
SHEET (EA) TOPICAL
COMMUNITY

## 2018-04-04 NOTE — MR AVS SNAPSHOT
After Visit Summary   4/4/2018    Sanam Morin    MRN: [id number]           Patient Information     Date Of Birth          1990        Visit Information        Provider Department      4/4/2018 10:20 AM Stacey Ramírez PA-C [facility]        Today's Diagnoses     Acute pharyngitis, unspecified etiology    -  1    Upper respiratory tract infection, unspecified type        Tobacco use disorder          Care Instructions       * PHARYNGITIS (Sore Throat),REPORT PENDING    Pharyngitis (sore throat) is often due to a virus, but can also be caused by the  strep  bacteria. This is called  strep throat . Both viral and strep infection can cause throat pain that is worse when swallowing, aching all over with headache and fever. Both types of infections are contagious. They may be spread by coughing, kissing or touching others after touching your mouth or nose, so wash your hands often.  A test has been done to determine whether or not you have strep throat. If it is positive for strep infection you will usually need to take antibiotics. If the test is negative, you probably have a viral pharyngitis, and antibiotic treatment will not help you recover.  HOME CARE:    If your symptoms are severe, rest at home for the first 2-3 days. If you are told that your test is positive for strep, you should be off work and school for the first two days of antibiotic treatment. After that, you will no longer be as contagious.    Children: Use acetaminophen (Tylenol) for fever, fussiness or discomfort. In infants over six months of age, you may use ibuprofen (Children's Motrin) instead of Tylenol. [NOTE: If your child has chronic liver or kidney disease or ever had a stomach ulcer or GI bleeding, talk with your doctor before using these medicines.]   (Aspirin should never be used in anyone under 18 years of age who is ill with a fever. It may cause severe liver damage.)  Adults: You may use  acetaminophen (Tylenol) 650-1000 mg every 6 hours or ibuprofen (Motrin, Advil) 600 mg every 6-8 hours with food to control pain, if you are able to take these medicines. [NOTE: If you have chronic liver or kidney disease or ever had a stomach ulcer or GI bleeding, talk with your doctor before using these medicines.]    Throat lozenges or sprays (Chloraseptic and others), or gargling with warm salt water will reduce throat pain. Dissolve 1/2 teaspoon of salt in 1 glass of warm water. This is especially useful just before meals.     FOLLOW UP with your doctor as advised by our staff if you are not improving over the next week.  GET PROMPT MEDICAL ATTENTION  if any of the following occur:    Fever over 101 F (38.3 C) for more than three days    New or worsening ear pain, sinus pain or headache    Unable to swallow liquids or open your mouth wide due to throat pain    Trouble breathing    Muffled voice    New rash       [id number] [facility]. [address] [place] [address], [place] [address]. All rights reserved. This information is not intended as a substitute for professional medical care. Always follow your healthcare professional's instructions.  This information has been modified by your health care provider with permission from the publisher.    Throat culture pending - will be notified of positive results only.    Please FOLLOW UP at primary care clinic if not improving, new symptoms, worse or this does not resolve.  [facility]  [phone]            Follow-ups after your visit        Who to contact     You can reach your care team any time of the day by calling [phone].  Notification of test results:  If you have an abnormal lab result, we will notify you by phone as soon as possible.         Additional Information About Your Visit        MyChart Information     [employer] lets you send messages to your doctor, view your test results, renew your prescriptions, schedule appointments and  "more. To sign up, go to [email or web address] . Click on \"Log in\" on the left side of the screen, which will take you to the Welcome page. Then click on \"Sign up Now\" on the right side of the page.     You will be asked to enter the access code listed below, as well as some personal information. Please follow the directions to create your username and password.     Your access code is: [email or web address]  Expires: 7/3/2018 11:08 AM     Your access code will  in 90 days. If you need help or a new code, please call your [facility] clinic or [phone].        Care EveryWhere ID     This is your Care EveryWhere ID. This could be used by other organizations to access your [facility] medical records  [id number]        Your Vitals Were     Pulse Temperature Pulse Oximetry             91 99  F (37.2  C) (Oral) 97%          Blood Pressure from Last 3 Encounters:   18 124/77   10/26/17 95/68   17 100/58    Weight from Last 3 Encounters:   17 162 lb 14.4 oz (73.9 kg)   17 156 lb 8 oz (71 kg)   17 157 lb 8 oz (71.4 kg)              We Performed the Following     BETA STREP GROUP A R/O CULTURE     RAPID STREP SCREEN        Primary Care Provider Office Phone # Fax #    Nusrat Paola Rodriguez, [facility] [phone] [address] [place] [address]        Equal Access to Services     [facility]: Hadii aad ku hadasho Soomaali, waaxda luqadaha, qaybta kaalmada adeegyada, waxay elvira toussaint. So [facility] [phone].    ATENCIÓN: Si habla español, tiene a erickson disposición servicios gratuitos de asistencia lingüística. Llame al [phone].    We comply with applicable federal civil rights laws and [place] laws. We do not discriminate on the basis of race, color, national origin, age, disability, sex, sexual orientation, or gender identity.            Thank you!     Thank you for choosing [facility]  for your care. Our goal is always to " provide you with excellent care. Hearing back from our patients is one way we can continue to improve our services. Please take a few minutes to complete the written survey that you may receive in the mail after your visit with us. Thank you!             Your Updated Medication List - Protect others around you: Learn how to safely use, store and throw away your medicines at www.disposemymeds.org.          This list is accurate as of 4/4/18 11:10 AM.  Always use your most recent med list.                   Brand Name Dispense Instructions for use Diagnosis    ALEVE PO           hydrOXYzine 25 MG tablet    ATARAX     TK 1 T PO TID PRN        LAMICTAL PO      Take 150 mg by mouth daily    Episodic tension-type headache, not intractable       RYAN-e 400 MG Tabs

## 2018-04-04 NOTE — PROGRESS NOTES
Chief Complaint   Patient presents with     Pharyngitis     started today     Generalized Body Aches     x 3 days          SUBJECTIVE:   Pt. presenting to [facility] -  with a chief complaint of generalized malaise, some URI symptoms and now ST. Low grade temp. Wants to RO strep..   See CC.  Hx of asthma no.  Onset of symptoms few days  Course of illness is same.    Severity moderate  Current and Associated symptoms: runny nose, stuffy nose, sore throat, headache, body aches and fatigue  Treatment measures tried include Tylenol/Ibuprofen.  Predisposing factors include tobacco use.  Last antibiotic 2017  Bactrim     Pregnancy no  Smoker yes    ROS:  Afebrile now but feels feverish at times  Energy level is <  ENT - denies ear pain. Some nasal congestion  CP - no cough,SOB or chest pain   GI- - appetite <. No nausea, vomiting or diarrhea.   No bowel or bladder changes   MSK - no joint pain or swelling   Skin: No rashes    Past Medical History:   Diagnosis Date     Abnormal Pap smear     during pregnancy     H/O colposcopy with cervical biopsy 14    LSIL on biopsy     High risk HPV infection 14    papNIL/+ HR HPV 16.     Moderate major depression (H) 2011     Pap smear of cervix with ASCUS, cannot exclude HGSIL 13     Postpartum depression     major post partum depression.      S/P LASIK surgery      Past Surgical History:   Procedure Laterality Date      SECTION, TUBAL LIGATION, COMBINED  2014    Procedure:  section and bilateral tubal ligation;  Surgeon: Mayank Ríos MD;  Location:  L+D     CONIZATION N/A 10/6/2014    Procedure: CONIZATION;  Surgeon: Jas Johnson MD;  Location:  OR     DILATION AND CURETTAGE, HYSTEROSCOPY DIAGNOSTIC, COMBINED N/A 10/6/2014    Procedure: COMBINED DILATION AND CURETTAGE, HYSTEROSCOPY DIAGNOSTIC;  Surgeon: Jas Johnson MD;  Location:  OR     LASIK       MYRINGOTOMY, INSERT TUBE, COMBINED        Patient Active Problem List   Diagnosis     GERD (gastroesophageal reflux disease)     Tobacco use disorder     Obesity (BMI 30-39.9)     Insomnia     Bipolar affective disorder (H)     Acne     S/P LEEP (status post loop electrosurgical excision procedure)     CARDIOVASCULAR SCREENING; LDL GOAL LESS THAN 160     S/P      Anxiety     Dysplasia of cervix     Menorrhagia     Current Outpatient Prescriptions   Medication     S-Adenosylmethionine (RYAN-E) 400 MG TABS     Naproxen Sodium (ALEVE PO)     hydrOXYzine (ATARAX) 25 MG tablet     LamoTRIgine (LAMICTAL PO)     No current facility-administered medications for this visit.        OBJECTIVE:  /77  Pulse 91  Temp 99  F (37.2  C) (Oral)  SpO2 97%    GENERAL APPEARANCE: cooperative, alert and no distress. Appears well hydrated.  EYES: conjunctiva clear  HENT: Rt ear canal  clear and TM normal   Lt ear canal clear and TM normal   Nose some congestion. no discharge  Mouth without ulcers or lesions. mild erythema. no exudate. Some PND noted  NECK: supple, few small shoddy NT ant nodes. No  posterior nodes.  RESP: lungs clear to auscultation - no rales, rhonchi or wheezes. Breathing easily.  CV: regular rates and rhythm  ABDOMEN:  soft, nontender, no HSM or masses and bowel sounds normal   SKIN: no suspicious lesions or rashes  no tenderness to palpate over  sinus areas.    Rapid strep neg    ASSESSMENT:     Acute pharyngitis, unspecified etiology  Upper respiratory tract infection, unspecified type  Tobacco use disorder      PLAN:  Symptomatic measures   Throat culture pending - will be notified of positive results only.  Discussed with pt this appears to be a viral etiology and antibiotics do not help viral infections. If symptoms change, persist or increase then reevaluation is needed.  Salt water gargles  - throat lozenges or honey/lemon tea if soothing   saline nasal spray for  nasal congestion   Cool mist vaporizer.  Smoking discouraged -  Discussed > CV,CP and cancer risks with tobacco use.   Stay in clean air environment.  > rest.  > fluids.  Contagiousness and hygiene discussed.  Fever and pain  control measures discussed.   If unable to swallow or any breathing difficulty to go to ED   AVS given and discussed:  Patient Instructions      * PHARYNGITIS (Sore Throat),REPORT PENDING    Pharyngitis (sore throat) is often due to a virus, but can also be caused by the  strep  bacteria. This is called  strep throat . Both viral and strep infection can cause throat pain that is worse when swallowing, aching all over with headache and fever. Both types of infections are contagious. They may be spread by coughing, kissing or touching others after touching your mouth or nose, so wash your hands often.  A test has been done to determine whether or not you have strep throat. If it is positive for strep infection you will usually need to take antibiotics. If the test is negative, you probably have a viral pharyngitis, and antibiotic treatment will not help you recover.  HOME CARE:    If your symptoms are severe, rest at home for the first 2-3 days. If you are told that your test is positive for strep, you should be off work and school for the first two days of antibiotic treatment. After that, you will no longer be as contagious.    Children: Use acetaminophen (Tylenol) for fever, fussiness or discomfort. In infants over six months of age, you may use ibuprofen (Children's Motrin) instead of Tylenol. [NOTE: If your child has chronic liver or kidney disease or ever had a stomach ulcer or GI bleeding, talk with your doctor before using these medicines.]   (Aspirin should never be used in anyone under 18 years of age who is ill with a fever. It may cause severe liver damage.)  Adults: You may use acetaminophen (Tylenol) 650-1000 mg every 6 hours or ibuprofen (Motrin, Advil) 600 mg every 6-8 hours with food to control pain, if you are able to take these medicines.  [NOTE: If you have chronic liver or kidney disease or ever had a stomach ulcer or GI bleeding, talk with your doctor before using these medicines.]    Throat lozenges or sprays (Chloraseptic and others), or gargling with warm salt water will reduce throat pain. Dissolve 1/2 teaspoon of salt in 1 glass of warm water. This is especially useful just before meals.     FOLLOW UP with your doctor as advised by our staff if you are not improving over the next week.  GET PROMPT MEDICAL ATTENTION  if any of the following occur:    Fever over 101 F (38.3 C) for more than three days    New or worsening ear pain, sinus pain or headache    Unable to swallow liquids or open your mouth wide due to throat pain    Trouble breathing    Muffled voice    New rash       [id number] [employer]. [address] [place] [address], [place] [address]. All rights reserved. This information is not intended as a substitute for professional medical care. Always follow your healthcare professional's instructions.  This information has been modified by your health care provider with permission from the publisher.    Throat culture pending - will be notified of positive results only.    Please FOLLOW UP at primary care clinic if not improving, new symptoms, worse or this does not resolve.  [facility]  [phone]        See letter for work ([employer])  Pt is comfortable with this plan.  Electronically signed,  SARAH Ramírez PAC

## 2018-04-04 NOTE — NURSING NOTE
"Chief Complaint   Patient presents with     Pharyngitis     started today     Generalized Body Aches     x 3 days        Initial /77  Pulse 91  Temp 99  F (37.2  C) (Oral)  SpO2 97% Estimated body mass index is 28.31 kg/(m^2) as calculated from the following:    Height as of 6/19/17: 5' 3.6\" (1.615 m).    Weight as of 9/26/17: 162 lb 14.4 oz (73.9 kg).  Medication Reconciliation: complete   Gabrielle Gilliam      "

## 2018-04-04 NOTE — PATIENT INSTRUCTIONS
* PHARYNGITIS (Sore Throat),REPORT PENDING    Pharyngitis (sore throat) is often due to a virus, but can also be caused by the  strep  bacteria. This is called  strep throat . Both viral and strep infection can cause throat pain that is worse when swallowing, aching all over with headache and fever. Both types of infections are contagious. They may be spread by coughing, kissing or touching others after touching your mouth or nose, so wash your hands often.  A test has been done to determine whether or not you have strep throat. If it is positive for strep infection you will usually need to take antibiotics. If the test is negative, you probably have a viral pharyngitis, and antibiotic treatment will not help you recover.  HOME CARE:    If your symptoms are severe, rest at home for the first 2-3 days. If you are told that your test is positive for strep, you should be off work and school for the first two days of antibiotic treatment. After that, you will no longer be as contagious.    Children: Use acetaminophen (Tylenol) for fever, fussiness or discomfort. In infants over six months of age, you may use ibuprofen (Children's Motrin) instead of Tylenol. [NOTE: If your child has chronic liver or kidney disease or ever had a stomach ulcer or GI bleeding, talk with your doctor before using these medicines.]   (Aspirin should never be used in anyone under 18 years of age who is ill with a fever. It may cause severe liver damage.)  Adults: You may use acetaminophen (Tylenol) 650-1000 mg every 6 hours or ibuprofen (Motrin, Advil) 600 mg every 6-8 hours with food to control pain, if you are able to take these medicines. [NOTE: If you have chronic liver or kidney disease or ever had a stomach ulcer or GI bleeding, talk with your doctor before using these medicines.]    Throat lozenges or sprays (Chloraseptic and others), or gargling with warm salt water will reduce throat pain. Dissolve 1/2 teaspoon of salt in 1 glass of  warm water. This is especially useful just before meals.     FOLLOW UP with your doctor as advised by our staff if you are not improving over the next week.  GET PROMPT MEDICAL ATTENTION  if any of the following occur:    Fever over 101 F (38.3 C) for more than three days    New or worsening ear pain, sinus pain or headache    Unable to swallow liquids or open your mouth wide due to throat pain    Trouble breathing    Muffled voice    New rash       [id number] [employer]. [address] [place] [address]. All rights reserved. This information is not intended as a substitute for professional medical care. Always follow your healthcare professional's instructions.  This information has been modified by your health care provider with permission from the publisher.    Throat culture pending - will be notified of positive results only.    Please FOLLOW UP at primary care clinic if not improving, new symptoms, worse or this does not resolve.  [facility]  [phone]

## 2018-04-06 LAB
BACTERIA SPEC CULT: NORMAL
SPECIMEN SOURCE: NORMAL

## 2018-04-17 ENCOUNTER — OFFICE VISIT (OUTPATIENT)
Dept: URGENT CARE | Facility: RETAIL CLINIC | Age: 28
End: 2018-04-17
Payer: MEDICARE

## 2018-04-17 VITALS
TEMPERATURE: 98.8 F | SYSTOLIC BLOOD PRESSURE: 102 MMHG | OXYGEN SATURATION: 98 % | DIASTOLIC BLOOD PRESSURE: 71 MMHG | HEART RATE: 106 BPM

## 2018-04-17 DIAGNOSIS — J01.90 ACUTE SINUSITIS WITH SYMPTOMS > 10 DAYS: Primary | ICD-10-CM

## 2018-04-17 PROCEDURE — 99213 OFFICE O/P EST LOW 20 MIN: CPT | Performed by: PHYSICIAN ASSISTANT

## 2018-04-17 NOTE — PROGRESS NOTES
Chief Complaint   Patient presents with     Cough     since sunday, productive cough some times, no fevers     Sinus Problem     x 2 weeks, green mucus     SUBJECTIVE:  Sanam Morin is a 28 year old female here with concerns about sinus infection.  She states onset of symptoms was just over 2 weeks ago.    Course of illness is worsening.   Severity moderate  She has had maxillary pressure as well as nasal congestion, rhinorrhea, cough, sore throat, facial pain/pressure, tooth pain, headache, post-nasal drainage and nausea.  Predisposing factors include tobacco abuse.  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     Moderate major depression (H) 6/28/2011     Pap smear of cervix with ASCUS, cannot exclude HGSIL 8/20/13     Postpartum depression     major post partum depression.      S/P LASIK surgery      Current Outpatient Prescriptions   Medication Sig Dispense Refill     GuaiFENesin (COUGH SYRUP PO)        amoxicillin-clavulanate (AUGMENTIN) 875-125 MG per tablet Take 1 tablet by mouth 2 times daily for 10 days 20 tablet 0     S-Adenosylmethionine (RYAN-E) 400 MG TABS        Naproxen Sodium (ALEVE PO)        hydrOXYzine (ATARAX) 25 MG tablet TK 1 T PO TID PRN  5     LamoTRIgine (LAMICTAL PO) Take 150 mg by mouth daily       Social History   Substance Use Topics     Smoking status: Current Every Day Smoker     Packs/day: 1.00     Years: 11.00     Types: Cigarettes     Smokeless tobacco: Never Used     Alcohol use 0.6 - 1.2 oz/week     1 - 2 Glasses of wine per week     Allergies   Allergen Reactions     Moxifloxacin      diahreaa     Percocet [Oxycodone-Acetaminophen] GI Disturbance     Wellbutrin [Bupropion Hcl] Other (See Comments)     Suicidal thoughts     Yellow Dye      Number 5  Hives  and diahreaa     Accutane [Isotretinoin] Other (See Comments)     Suicidal thoughts  Suicidal ideation     Spironolactone Rash     ROS:  Review of systems negative except as stated above.    OBJECTIVE:  /71 (BP Location: Left arm)  Pulse 106  Temp 98.8  F (37.1  C) (Tympanic)  SpO2 98%  GENERAL APPEARANCE: healthy, alert and no distress  EYES: PERRL, conjunctiva clear  HENT: Pain with palpation over frontal and maxillary sinuses. Ear canals normal TMs with mild serous effusions bilaterally. Nasal turbinates edematous with clear and yellow discharge bilaterally. Posterior pharynx is not erythematous.  NECK: supple, nontender, no lymphadenopathy  RESP: lungs clear to auscultation - no rales, rhonchi or wheezes  CV: regular rates and rhythm, normal S1 S2, no murmur noted    ASSESSMENT:    ICD-10-CM    1. Acute sinusitis with symptoms > 10 days J01.90 amoxicillin-clavulanate (AUGMENTIN) 875-125 MG per tablet     PLAN:   Patient Instructions   Augmentin (amoxicillin-clavulanate) 875mg twice daily for 10 days as directed.  Flonase 2 sprays in each nostril daily until symptoms resolve, then continue 1 spray in each nostril for at least 5 more days.  Take Tylenol or an NSAID such as ibuprofen or naproxen as needed for pain.  May use netti pot with bottled or distilled water and saline packets to flush sinuses.  Sudafed (pseudoephedrine) behind the pharmacist counter for 3-5 days helps relieve congestion.  Afrin (oxymetazoline) nasal spray twice daily for 3 days. Stop after 3 days.  Mucinex (guiafenesin) thins mucus and may help it to loosen more quickly  Saline drops or nasal sprays may loosen mucus.  Sit in the bathroom with the door closed and hot shower running to loosen mucus.  Contact primary care clinic if you do not have any relief from your symptoms after 10 days.  Present to emergency room for significantly increasing pain, persistent high fever >102F, swelling/redness around your eyes, changes in your  vision or ability to move your eyes, altered mental status or a severe headache.    Follow up with primary care provider with any problems, questions or concerns or if symptoms worsen or fail to improve. Patient agreed to plan and verbalized understanding.    Ashleigh Gutierrez PA-C  [facility]

## 2018-04-17 NOTE — MR AVS SNAPSHOT
After Visit Summary   4/17/2018    Sanam Morin    MRN: [id number]           Patient Information     Date Of Birth          1990        Visit Information        Provider Department      4/17/2018 12:10 PM Emilia Gutierrez PA-C [facility]        Today's Diagnoses     Acute sinusitis with symptoms > 10 days    -  1      Care Instructions    Augmentin (amoxicillin-clavulanate) 875mg twice daily for 10 days as directed.  Flonase 2 sprays in each nostril daily until symptoms resolve, then continue 1 spray in each nostril for at least 5 more days.  Take Tylenol or an NSAID such as ibuprofen or naproxen as needed for pain.  May use netti pot with bottled or distilled water and saline packets to flush sinuses.  Sudafed (pseudoephedrine) behind the pharmacist counter for 3-5 days helps relieve congestion.  Afrin (oxymetazoline) nasal spray twice daily for 3 days. Stop after 3 days.  Mucinex (guiafenesin) thins mucus and may help it to loosen more quickly  Saline drops or nasal sprays may loosen mucus.  Sit in the bathroom with the door closed and hot shower running to loosen mucus.  Contact primary care clinic if you do not have any relief from your symptoms after 10 days.  Present to emergency room for significantly increasing pain, persistent high fever >102F, swelling/redness around your eyes, changes in your vision or ability to move your eyes, altered mental status or a severe headache.          Follow-ups after your visit        Your next 10 appointments already scheduled     Apr 20, 2018 10:00 AM CDT   Office Visit with [facility]    [address] [place] [id number] [phone]           Bring a current list of meds and any records pertaining to this visit. For Physicals, please bring immunization records and any forms needing to be filled out. Please arrive 10 minutes early to  "complete paperwork.              Who to contact     You can reach your care team any time of the day by calling [phone].  Notification of test results:  If you have an abnormal lab result, we will notify you by phone as soon as possible.         Additional Information About Your Visit        MyChart Information     [employer] lets you send messages to your doctor, view your test results, renew your prescriptions, schedule appointments and more. To sign up, go to [email or web address] . Click on \"Log in\" on the left side of the screen, which will take you to the Welcome page. Then click on \"Sign up Now\" on the right side of the page.     You will be asked to enter the access code listed below, as well as some personal information. Please follow the directions to create your username and password.     Your access code is: [email or web address]  Expires: 7/3/2018 11:08 AM     Your access code will  in 90 days. If you need help or a new code, please call your [facility] clinic or [phone].        Care EveryWhere ID     This is your Care EveryWhere ID. This could be used by other organizations to access your [facility] medical records  [id number]        Your Vitals Were     Pulse Temperature Pulse Oximetry             106 98.8  F (37.1  C) (Tympanic) 98%          Blood Pressure from Last 3 Encounters:   18 102/71   18 124/77   10/26/17 95/68    Weight from Last 3 Encounters:   17 162 lb 14.4 oz (73.9 kg)   17 156 lb 8 oz (71 kg)   17 157 lb 8 oz (71.4 kg)              Today, you had the following     No orders found for display         Today's Medication Changes          These changes are accurate as of 18 12:27 PM.  If you have any questions, ask your nurse or doctor.               Start taking these medicines.        Dose/Directions    amoxicillin-clavulanate 875-125 MG per tablet   Commonly known as:  AUGMENTIN   Used for:  Acute sinusitis with symptoms > 10 days        Dose:  1 " tablet   Take 1 tablet by mouth 2 times daily for 10 days   Quantity:  20 tablet   Refills:  0            Where to get your medicines      These medications were sent to [id number] - [place], [place] - [address]  [address], [facility] [address]    Hours:  [facility] [place] - numbers unchanged   9/8/03  Phone:  [phone]     amoxicillin-clavulanate 875-125 MG per tablet                Primary Care Provider Office Phone # Fax #    Nusrat Guevara Michael, APRN [facility] [phone]       [address] [place] [address]        Equal Access to Services     [facility]: Hadii aad ku hadasho Soomaali, waaxda luqadaha, qaybta kaalmada adeegyada, catherine felix haymargy james . So [facility] [phone].    ATENCIÓN: Si habla español, tiene a erickson disposición servicios gratuitos de asistencia lingüística. [facility] [phone].    We comply with applicable federal civil rights laws and [place] laws. We do not discriminate on the basis of race, color, national origin, age, disability, sex, sexual orientation, or gender identity.            Thank you!     Thank you for choosing [facility]  for your care. Our goal is always to provide you with excellent care. Hearing back from our patients is one way we can continue to improve our services. Please take a few minutes to complete the written survey that you may receive in the mail after your visit with us. Thank you!             Your Updated Medication List - Protect others around you: Learn how to safely use, store and throw away your medicines at www.disposemymeds.org.          This list is accurate as of 4/17/18 12:27 PM.  Always use your most recent med list.                   Brand Name Dispense Instructions for use Diagnosis    ALEVE PO           amoxicillin-clavulanate 875-125 MG per tablet    AUGMENTIN    20 tablet    Take 1 tablet by mouth 2 times daily for 10 days    Acute sinusitis with symptoms > 10 days       COUGH SYRUP  PO           hydrOXYzine 25 MG tablet    ATARAX     TK 1 T PO TID PRN        LAMICTAL PO      Take 150 mg by mouth daily    Episodic tension-type headache, not intractable       RYAN-e 400 MG Tabs

## 2018-04-17 NOTE — PATIENT INSTRUCTIONS
Augmentin (amoxicillin-clavulanate) 875mg twice daily for 10 days as directed.  Flonase 2 sprays in each nostril daily until symptoms resolve, then continue 1 spray in each nostril for at least 5 more days.  Take Tylenol or an NSAID such as ibuprofen or naproxen as needed for pain.  May use netti pot with bottled or distilled water and saline packets to flush sinuses.  Sudafed (pseudoephedrine) behind the pharmacist counter for 3-5 days helps relieve congestion.  Afrin (oxymetazoline) nasal spray twice daily for 3 days. Stop after 3 days.  Mucinex (guiafenesin) thins mucus and may help it to loosen more quickly  Saline drops or nasal sprays may loosen mucus.  Sit in the bathroom with the door closed and hot shower running to loosen mucus.  Contact primary care clinic if you do not have any relief from your symptoms after 10 days.  Present to emergency room for significantly increasing pain, persistent high fever >102F, swelling/redness around your eyes, changes in your vision or ability to move your eyes, altered mental status or a severe headache.

## 2018-04-17 NOTE — NURSING NOTE
"Chief Complaint   Patient presents with     Cough     since sunday, productive cough some times, no fevers     Sinus Problem     x 2 weeks, green mucus       Initial /71 (BP Location: Left arm)  Pulse 106  Temp 98.8  F (37.1  C) (Tympanic)  SpO2 98% Estimated body mass index is 28.31 kg/(m^2) as calculated from the following:    Height as of 6/19/17: 5' 3.6\" (1.615 m).    Weight as of 9/26/17: 162 lb 14.4 oz (73.9 kg).  Medication Reconciliation: complete    "

## 2018-04-20 ENCOUNTER — OFFICE VISIT (OUTPATIENT)
Dept: FAMILY MEDICINE | Facility: CLINIC | Age: 28
End: 2018-04-20
Payer: MEDICARE

## 2018-04-20 VITALS
TEMPERATURE: 97.6 F | HEIGHT: 64 IN | DIASTOLIC BLOOD PRESSURE: 80 MMHG | SYSTOLIC BLOOD PRESSURE: 126 MMHG | BODY MASS INDEX: 28.34 KG/M2 | HEART RATE: 100 BPM | WEIGHT: 166 LBS | OXYGEN SATURATION: 98 %

## 2018-04-20 DIAGNOSIS — R10.13 ABDOMINAL PAIN, EPIGASTRIC: Primary | ICD-10-CM

## 2018-04-20 PROCEDURE — 99214 OFFICE O/P EST MOD 30 MIN: CPT | Performed by: NURSE PRACTITIONER

## 2018-04-20 NOTE — MR AVS SNAPSHOT
After Visit Summary   4/20/2018    Sanam Morin    MRN: [id number]           Patient Information     Date Of Birth          1990        Visit Information        Provider Department      4/20/2018 10:00 AM Nusrat Rodriguez APRN CNP [facility]        Today's Diagnoses     Abdominal pain, epigastric    -  1       Follow-ups after your visit        Additional Services     GASTROENTEROLOGY ADULT REF PROCEDURE ONLY [facility] [phone]; No Provider Preference (ASAP)       Last Lab Result: Creatinine (mg/dL)       Date                     Value                 11/16/2015               0.76             ----------  Body mass index is 28.94 kg/(m^2).      Patient will be contacted to schedule procedure.     Please be aware that coverage of these services is subject to the terms and limitations of your health insurance plan.  Call member services at your health plan with any benefit or coverage questions.  Any procedures must be performed at a [place] facility OR coordinated by your clinic's referral office.    Please bring the following with you to your appointment:    (1) Any X-Rays, CTs or MRIs which have been performed.  Contact the facility where they were done to arrange for  prior to your scheduled appointment.    (2) List of current medications   (3) This referral request   (4) Any documents/labs given to you for this referral                  Future tests that were ordered for you today     Open Future Orders        Priority Expected Expires Ordered    HCG qualitative urine Routine  4/20/2019 4/20/2018            Who to contact     If you have questions or need follow up information about today's clinic visit or your schedule please contact [facility] directly at [phone].  Normal or non-critical lab and imaging results will be communicated to you by MyChart, letter or phone within 4 business days after the clinic has received the  "results. If you do not hear from us within 7 days, please contact the clinic through Vimodi or phone. If you have a critical or abnormal lab result, we will notify you by phone as soon as possible.  Submit refill requests through Vimodi or call your pharmacy and they will forward the refill request to us. Please allow 3 business days for your refill to be completed.          Additional Information About Your Visit        Vimodi Information     Vimodi lets you send messages to your doctor, view your test results, renew your prescriptions, schedule appointments and more. To sign up, go to [email or web address] . Click on \"Log in\" on the left side of the screen, which will take you to the Welcome page. Then click on \"Sign up Now\" on the right side of the page.     You will be asked to enter the access code listed below, as well as some personal information. Please follow the directions to create your username and password.     Your access code is: [email or web address]  Expires: 7/3/2018 11:08 AM     Your access code will  in 90 days. If you need help or a new code, please call your [place] clinic or [phone].        Care EveryWhere ID     This is your Care EveryWhere ID. This could be used by other organizations to access your [place] medical records  [id number]        Your Vitals Were     Pulse Temperature Height Last Period Pulse Oximetry BMI (Body Mass Index)    100 97.6  F (36.4  C) (Temporal) 5' 3.5\" (1.613 m) 2018 98% 28.94 kg/m2       Blood Pressure from Last 3 Encounters:   18 126/80   18 102/71   18 124/77    Weight from Last 3 Encounters:   18 166 lb (75.3 kg)   17 162 lb 14.4 oz (73.9 kg)   17 156 lb 8 oz (71 kg)              We Performed the Following     GASTROENTEROLOGY ADULT REF PROCEDURE ONLY [facility] [phone]; No Provider Preference (ASAP)          Today's Medication Changes          These changes are accurate as of 18 10:41 AM.  " If you have any questions, ask your nurse or doctor.               Start taking these medicines.        Dose/Directions    ranitidine 150 MG tablet   Commonly known as:  ZANTAC   Used for:  Abdominal pain, epigastric   Started by:  Nusrat Rodriguez APRN CNP        Dose:  150 mg   Take 1 tablet (150 mg) by mouth 2 times daily   Quantity:  60 tablet   Refills:  1            Where to get your medicines      These medications were sent to [id number] - [place], [place] - [address]  [address], [facility] [address]    Hours:  [place]ly [place] - numbers unchanged   9/8/03  Phone:  [phone]     ranitidine 150 MG tablet                Primary Care Provider Office Phone # Fax #    NELLIE Sage [phone]        [address] [place] [address]        Equal Access to Services     [facility]: Hadii karrie valderrama hadasho Soomaali, waaxda luqadaha, qaybta kaalmada adeegyada, waxeduar james . So [facility] [phone].    ATENCIÓN: Si habla español, tiene a erickson disposición servicios gratuitos de asistencia lingüística. [facility] [phone].    We comply with applicable federal civil rights laws and [place] laws. We do not discriminate on the basis of race, color, national origin, age, disability, sex, sexual orientation, or gender identity.            Thank you!     Thank you for choosing [facility]  for your care. Our goal is always to provide you with excellent care. Hearing back from our patients is one way we can continue to improve our services. Please take a few minutes to complete the written survey that you may receive in the mail after your visit with us. Thank you!             Your Updated Medication List - Protect others around you: Learn how to safely use, store and throw away your medicines at www.disposemymeds.org.          This list is accurate as of 4/20/18 10:41 AM.  Always use your most recent med list.                   Brand Name  Dispense Instructions for use Diagnosis    amoxicillin-clavulanate 875-125 MG per tablet    AUGMENTIN    20 tablet    Take 1 tablet by mouth 2 times daily for 10 days    Acute sinusitis with symptoms > 10 days       hydrOXYzine 25 MG tablet    ATARAX     TK 1 T PO TID PRN        ranitidine 150 MG tablet    ZANTAC    60 tablet    Take 1 tablet (150 mg) by mouth 2 times daily    Abdominal pain, epigastric       RYAN-e 400 MG Tabs

## 2018-04-20 NOTE — LETTER
Upper Endoscopy    Date of procedure:___________      Location:________________  Please arrive at:_____________    Procedure time:________________    Review the preparation schedule below for the five days preceding your procedure.     If you have any questions, please call [phone] and press option 2.          5 Days Prior  *CHECK WITH YOUR INSURANCE REGARDING COVERAGE PRIOR TO PROCEDURE.  If you take Coumadin (Warfarin), Plavix, Ticlid, Aggrenox:, insulin, diabetic pills and/or iron products contact your doctor for specific instructions.  Have INR checked the day before the procedure.  Please remember to arrange a responsible adult to accompany you home.   must be     present upon discharge.    1 Days Prior  Eat normally up until midnight.  You may drink small amounts of clear liquids up until 4 hours prior to your arrival.  **Please see Clear Liquid Diet Sheet for suggested liquids.    Procedure Day    From midnight until 3 hours prior to your procedure, you may drink small amounts of clear liquids.  Do not take your morning medications until after you have completed your procedure.  Bring a list of your medications with you on the day of your procedure.     *Reminder:  Have transportation arranged to take you home.   must accompany you to the procedure.  Do not plan to drive or operate vehicles or machinery the day of your exam.      Clear Liquid Diet  Beverages  Coffee, Decaffeinated coffee, Tea (without milk or non-dairy creamer)  Carbonated beverages (pop)  Water  Sports Drinks    Desserts  Jello (except red or purple)  Fruit ice  Popsicle    Fruit and Fruit Juices  Citrus juices, strained  Fruit nectars, strained  Apple juice  Fruit drinks    Soups  Broth or Bouillon  Consomme  Meat stock, strained  Vegetable broth, strained    Sweets  Hard candy, plain  Sugar    Miscellaneous  Flavorings  Salt    No red or purple colored juices or Jello  No dairy products  No foods containing seeds 2 days  prior to procedure  No alcoholic beverages               Directions to [facility]    From the North:   Take the [address] exit off of [address] (on the south side of [place]).  Turn left on [address]  Turn left at the first stoplight (at [place]).  The hospital and clinic is the third building on the left.     From the South:  Follow [address] north to the first [place] exit.  Exit on [address] following it to the right.  Turn left at the first stoplight.  The hospital and clinic is the third building on the left.    From the East:     Following [address] west, turn left at the stoplight onto [address] Follow [address] through [place].  Take a right at the light on [address] (at [facility]).  The hospital and clinic is the third building on the left.    From the West:    Following [address] going east, turn south on [address].  Take the [address] exit off of [address] (on the south side of [place]).  Follow [address] through [place] to the stoplight on [address] (at [facility]). Take a left.  The hospital and clinic is the third building on the left.        UPPER ENDOSCOPY INFORMATION  Upper endoscopy (also known as an upper GI endoscopy, esophagogastro-duodenoscopy [EGD], or panendoscopy) is a procedure that enables your physician to examine the lining of the upper part of your gastrointestinal tract, ie. The esophagus (swallowing tube), stomach, and duodenum (first portion of the small intestine) using a thin flexible tube with its own lens and light source.    Upper endoscopy is usually performed to evaluate symptoms of persistent upper abdominal pain, nausea, vomiting or difficulty swallowing.  It is also the best test for finding the cause of bleeding from the upper gastrointestinal tract.    Upper endoscopy is more accurate than x-ray films for detecting inflammation, ulcers or tumors of the esophagus, stomach and duodenum.  Upper  endoscopy can detect early cancer and can distinguish between benign (non-cancerous) and malignant (cancerous) conditions when biopsies (small tissue samples) of suspicious areas are obtained.  Biopsies are taken for many reasons and do not necessarily mean that cancer is suspected.  A cytology test (introduction of a small brush to collect cells) may also be performed.    Upper endoscopy is also used to treat conditions present in the upper gastrointestinal tract.  A variety of instruments can be passed through the endoscope that allow many abnormalities to be treated directly with little or no discomfort.  This may include stretching narrowed areas, removing polyps (usually benign growths) or swallowed objects, or treating upper gastrointestinal bleeding.  Safe and effective endoscopic control of bleeding has reduced the need for transfusions and surgery in many patients.    For the best and safest examination, the stomach must be completely empty.  You should have nothing to eat or drink, including water, for approximately 4 hours prior to your examination.    WHAT CAN BE EXPECTED DURING THE UPPER ENDOSCOPY?  Your doctor will review with you why upper endoscopy is being performed, whether any alternative tests are available, and possible complications from the procedure.  Your throat will be sprayed with a local anesthetic before the procedure begins and you may be given medication through a vein to help you relax during the procedure.  While you are in a comfortable position on your side, the endoscope is passed through the mouth and then is passed in turn through the esophagus, stomach, and duodenum.  The endoscope does not interfere with your breathing during the test.  Most patients consider the procedure to be only slightly uncomfortable and many patients fall asleep during the procedure.    WHAT HAPPENS AFTER THE UPPER ENDOSCOPY?  After the procedure, you will be monitored in the endoscopy area until most  of the effects of the medication have work off.  Your throat may be a little sore for a while, and you may feel bloated right after the procedure because of the air introduced into your stomach during the test.  You will be able to resume your diet after you leave unless you are instructed otherwise.    If you receive sedation, you will need to arrange to have a responsible adult drive and accompany you home from the examination because the sedative may affect your judgment and reflexes for the rest of the day.  You will not be allowed to take a taxi or bus as transportation home.  If you receive sedation, you will not be allowed to drive after the procedure even though you may not feel tired.    WHAT ARE THE POSSIBLE COMPLICATIONS OF UPPER ENDOSCOPY?  Endoscopy is generally safe.  Complications can occur but are rare when the test is performed by physicians with specialized training and experience in this procedure.  Bleeding may occur from a biopsy site or where a polyp was removed.  It is usually minimal and rarely requires blood transfusions or surgery.  Localized irritation of the vein where the medication was injected may rarely cause a tender lump lasting for several weeks, but this will go away.  Applying heat packs or hot moist towels may help relieve discomfort.  Other potential risks include a reaction to the sedatives used and complications from underlying medical conditions.  Major complication, eg, perforation (a tear that might require surgery for repair) are very uncommon.      It is important for you to recognize early signs of any possible complication.  If you begin to run a fever after the test, begin to have trouble swallowing, or have increasing throat, chest or abdominal pain, let your doctor know about it promptly.

## 2018-04-20 NOTE — PROGRESS NOTES
SUBJECTIVE:   Sanam Morin is a 28 year old female who presents to clinic today for the following health issues:      ABDOMINAL PAIN     Onset: months    Description:   Character: Sharp  Location: epigastric region  Radiation: None    Intensity: moderate    Progression of Symptoms:  same    Accompanying Signs & Symptoms:  Fever/Chills?: no   Gas/Bloating: YES  Nausea: YES  Vomitting: no   Diarrhea?: no   Constipation:no   Dysuria or Hematuria: no    History:   Trauma: no   Previous similar pain: no    Previous tests done: none    Precipitating factors:   Does the pain change with:     Food: no      BM: no     Urination: no     Alleviating factors:  zantac    Therapies Tried and outcome: zantac helps alot    LMP:  4/18/18       The patient is seen in clinic today concerned about possible gastric ulcer.  She has been under an extreme amount of stress the past 6 months.  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  She reports having a persistent pain in the epigastrium and upper left quadrant of the abdomen.  Pain was relieved briefly by eating, was increased with an empty stomach. she has felt nauseous at times and bloated.  At times she would have a sharp burning pain in the upper abdomen.  She took Zantac for a while, and symptoms resolved.  She states she did not want to stay on medication if she did not have to, so discontinued taking it a few days ago, and now the pain has returned.  She is a cigarette smoker.  She was using naproxen twice daily, has discontinued the use of naproxen at this time.  She also drinks a lot of coffee.  She denies any bloody emesis or black tarry stools.  She has  a sinus infection, currently taking Augmentin.  This is also upsetting her stomach.  She states the bloating and lower abdominal issues completely resolved after taking the Zantac.    Problem list and histories reviewed & adjusted, as indicated.  Additional history: as documented    BP  "Readings from Last 3 Encounters:   04/20/18 126/80   04/17/18 102/71   04/04/18 124/77    Wt Readings from Last 3 Encounters:   04/20/18 166 lb (75.3 kg)   09/26/17 162 lb 14.4 oz (73.9 kg)   07/18/17 156 lb 8 oz (71 kg)                    Reviewed and updated as needed this visit by clinical staff       Reviewed and updated as needed this visit by Provider         ROS:  Constitutional, HEENT, cardiovascular, pulmonary, gi and gu systems are negative, except as otherwise noted.    OBJECTIVE:     /80  Pulse 100  Temp 97.6  F (36.4  C) (Temporal)  Ht 5' 3.5\" (1.613 m)  Wt 166 lb (75.3 kg)  LMP 04/18/2018  SpO2 98%  BMI 28.94 kg/m2  Body mass index is 28.94 kg/(m^2).   GENERAL: healthy, alert and no distress  NECK: no adenopathy, no asymmetry, masses, or scars and thyroid normal to palpation  RESP: lungs clear to auscultation - no rales, rhonchi or wheezes  CV: regular rate and rhythm, normal S1 S2, no S3 or S4, no murmur, click or rub, no peripheral edema and peripheral pulses strong  ABDOMEN: Soft, normal to percussion and auscultation.  Bowel sounds present in all quadrants.  Moderately tender in the epigastrium and left upper quadrant  MS: no gross musculoskeletal defects noted, no edema  SKIN: no suspicious lesions or rashes  NEURO: Normal strength and tone, mentation intact and speech normal        ASSESSMENT/PLAN:     Problem List Items Addressed This Visit     None      Visit Diagnoses     Abdominal pain, epigastric    -  Primary    Relevant Medications    ranitidine (ZANTAC) 150 MG tablet    Other Relevant Orders    HCG qualitative urine    GASTROENTEROLOGY ADULT REF PROCEDURE ONLY [facility] [phone]; No Provider Preference (ASAP)           Start Zantac 150 mg twice daily  Limit caffeine, continue  to avoid using naproxen or ibuprofen.  No aspirin.  Advised to consider smoking cessation  Will be scheduled for an EGD for further evaluation, further follow-up pending results    Nusrat" NELLIE Rodriguez [facility]

## 2018-04-23 ENCOUNTER — TELEPHONE (OUTPATIENT)
Dept: FAMILY MEDICINE | Facility: CLINIC | Age: 28
End: 2018-04-23

## 2018-04-23 NOTE — LETTER
Upper Endoscopy    Date of procedure:_4/30/2018__________      Location:_[facility] Same Day Surgery_  Please arrive at:_12:30pm____________    Procedure time:__1:30pm______________    Review the preparation schedule below for the five days preceding your procedure.     If you have any questions, please call [phone] and press option 2.          5 Days Prior  *CHECK WITH YOUR INSURANCE REGARDING COVERAGE PRIOR TO PROCEDURE.  If you take Coumadin (Warfarin), Plavix, Ticlid, Aggrenox:, insulin, diabetic pills and/or iron products contact your doctor for specific instructions.  Have INR checked the day before the procedure.  Please remember to arrange a responsible adult to accompany you home.   must be     present upon discharge.    1 Days Prior  Eat normally up until midnight.  You may drink small amounts of clear liquids up until 4 hours prior to your arrival.  **Please see Clear Liquid Diet Sheet for suggested liquids.    Procedure Day    From midnight until 3 hours prior to your procedure, you may drink small amounts of clear liquids.  Do not take your morning medications until after you have completed your procedure.  Bring a list of your medications with you on the day of your procedure.     *Reminder:  Have transportation arranged to take you home.   must accompany you to the procedure.  Do not plan to drive or operate vehicles or machinery the day of your exam.      Clear Liquid Diet  Beverages  Coffee, Decaffeinated coffee, Tea (without milk or non-dairy creamer)  Carbonated beverages (pop)  Water  Sports Drinks    Desserts  Jello (except red or purple)  Fruit ice  Popsicle    Fruit and Fruit Juices  Citrus juices, strained  Fruit nectars, strained  Apple juice  Fruit drinks    Soups  Broth or Bouillon  Consomme  Meat stock, strained  Vegetable broth, strained    Sweets  Hard candy, plain  Sugar    Miscellaneous  Flavorings  Salt    No red or purple colored juices or Jello  No dairy  products  No foods containing seeds 2 days prior to procedure  No alcoholic beverages               Directions to [facility]    From the North:   Take the [address] exit off of [address] (on the south side of [place]).  Turn left on [address]  Turn left at the first stoplight (at [facility]).  The hospital and clinic is the third building on the left.     From the South:  Follow [address] north to the first [place] exit.  Exit on [address] following it to the right.  Turn left at the first stoplight.  The hospital and clinic is the third building on the left.    From the East:     Following [address] west, turn left at the stoplight onto [address] Follow [address] through [place].  Take a right at the light on [address] (at [facility]).  The hospital and clinic is the third building on the left.    From the West:    Following [address] going east, turn south on [address].  Take the [address]  exit off of [address] (on the south side of [place]).  Follow [address] through [place] to the stoplight on [address] (at [facility]). Take a left.  The hospital and clinic is the third building on the left.        UPPER ENDOSCOPY INFORMATION  Upper endoscopy (also known as an upper GI endoscopy, esophagogastro-duodenoscopy [EGD], or panendoscopy) is a procedure that enables your physician to examine the lining of the upper part of your gastrointestinal tract, ie. The esophagus (swallowing tube), stomach, and duodenum (first portion of the small intestine) using a thin flexible tube with its own lens and light source.    Upper endoscopy is usually performed to evaluate symptoms of persistent upper abdominal pain, nausea, vomiting or difficulty swallowing.  It is also the best test for finding the cause of bleeding from the upper gastrointestinal tract.    Upper endoscopy is more accurate than x-ray films for detecting inflammation, ulcers or tumors of the  esophagus, stomach and duodenum.  Upper endoscopy can detect early cancer and can distinguish between benign (non-cancerous) and malignant (cancerous) conditions when biopsies (small tissue samples) of suspicious areas are obtained.  Biopsies are taken for many reasons and do not necessarily mean that cancer is suspected.  A cytology test (introduction of a small brush to collect cells) may also be performed.    Upper endoscopy is also used to treat conditions present in the upper gastrointestinal tract.  A variety of instruments can be passed through the endoscope that allow many abnormalities to be treated directly with little or no discomfort.  This may include stretching narrowed areas, removing polyps (usually benign growths) or swallowed objects, or treating upper gastrointestinal bleeding.  Safe and effective endoscopic control of bleeding has reduced the need for transfusions and surgery in many patients.    For the best and safest examination, the stomach must be completely empty.  You should have nothing to eat or drink, including water, for approximately 4 hours prior to your examination.    WHAT CAN BE EXPECTED DURING THE UPPER ENDOSCOPY?  Your doctor will review with you why upper endoscopy is being performed, whether any alternative tests are available, and possible complications from the procedure.  Your throat will be sprayed with a local anesthetic before the procedure begins and you may be given medication through a vein to help you relax during the procedure.  While you are in a comfortable position on your side, the endoscope is passed through the mouth and then is passed in turn through the esophagus, stomach, and duodenum.  The endoscope does not interfere with your breathing during the test.  Most patients consider the procedure to be only slightly uncomfortable and many patients fall asleep during the procedure.    WHAT HAPPENS AFTER THE UPPER ENDOSCOPY?  After the procedure, you will be  monitored in the endoscopy area until most of the effects of the medication have work off.  Your throat may be a little sore for a while, and you may feel bloated right after the procedure because of the air introduced into your stomach during the test.  You will be able to resume your diet after you leave unless you are instructed otherwise.    If you receive sedation, you will need to arrange to have a responsible adult drive and accompany you home from the examination because the sedative may affect your judgment and reflexes for the rest of the day.  You will not be allowed to take a taxi or bus as transportation home.  If you receive sedation, you will not be allowed to drive after the procedure even though you may not feel tired.    WHAT ARE THE POSSIBLE COMPLICATIONS OF UPPER ENDOSCOPY?  Endoscopy is generally safe.  Complications can occur but are rare when the test is performed by physicians with specialized training and experience in this procedure.  Bleeding may occur from a biopsy site or where a polyp was removed.  It is usually minimal and rarely requires blood transfusions or surgery.  Localized irritation of the vein where the medication was injected may rarely cause a tender lump lasting for several weeks, but this will go away.  Applying heat packs or hot moist towels may help relieve discomfort.  Other potential risks include a reaction to the sedatives used and complications from underlying medical conditions.  Major complication, eg, perforation (a tear that might require surgery for repair) are very uncommon.      It is important for you to recognize early signs of any possible complication.  If you begin to run a fever after the test, begin to have trouble swallowing, or have increasing throat, chest or abdominal pain, let your doctor know about it promptly.

## 2018-04-23 NOTE — LETTER
[address] [address]  [phone]        April 23, 2018    Sanam Morin  [address]   [facility] [address]

## 2018-04-24 DIAGNOSIS — F17.200 TOBACCO USE DISORDER: ICD-10-CM

## 2018-04-24 NOTE — TELEPHONE ENCOUNTER
Nicotine 7 MG      Last Written Prescription Date:  12/27/13  Last Fill Quantity: 30,   # refills: 1  Last Office Visit: 4/20/18  Future Office visit:       Routing refill request to provider for review/approval because:  Drug not on the [facility], P or [facility] refill protocol or controlled substance

## 2018-04-30 ENCOUNTER — SURGERY (OUTPATIENT)
Age: 28
End: 2018-04-30

## 2018-04-30 ENCOUNTER — HOSPITAL ENCOUNTER (OUTPATIENT)
Facility: CLINIC | Age: 28
Discharge: HOME | End: 2018-04-30
Attending: INTERNAL MEDICINE | Admitting: INTERNAL MEDICINE
Payer: MEDICARE

## 2018-04-30 VITALS
SYSTOLIC BLOOD PRESSURE: 91 MMHG | DIASTOLIC BLOOD PRESSURE: 74 MMHG | RESPIRATION RATE: 16 BRPM | OXYGEN SATURATION: 97 % | TEMPERATURE: 98.6 F | HEART RATE: 74 BPM

## 2018-04-30 LAB — UPPER GI ENDOSCOPY: NORMAL

## 2018-04-30 PROCEDURE — 25000128 H RX IP 250 OP 636: Performed by: INTERNAL MEDICINE

## 2018-04-30 PROCEDURE — 40000296 ZZH STATISTIC ENDO RECOVERY CLASS 1:2 FIRST HOUR: Performed by: INTERNAL MEDICINE

## 2018-04-30 PROCEDURE — 25000125 ZZHC RX 250: Performed by: INTERNAL MEDICINE

## 2018-04-30 PROCEDURE — 43235 EGD DIAGNOSTIC BRUSH WASH: CPT | Performed by: INTERNAL MEDICINE

## 2018-04-30 RX ORDER — LIDOCAINE 40 MG/G
CREAM TOPICAL
Status: DISCONTINUED | OUTPATIENT
Start: 2018-04-30 | End: 2018-04-30 | Stop reason: HOSPADM

## 2018-04-30 RX ORDER — FENTANYL CITRATE 50 UG/ML
INJECTION, SOLUTION INTRAMUSCULAR; INTRAVENOUS PRN
Status: DISCONTINUED | OUTPATIENT
Start: 2018-04-30 | End: 2018-04-30 | Stop reason: HOSPADM

## 2018-04-30 RX ORDER — ONDANSETRON 2 MG/ML
4 INJECTION INTRAMUSCULAR; INTRAVENOUS
Status: DISCONTINUED | OUTPATIENT
Start: 2018-04-30 | End: 2018-04-30 | Stop reason: HOSPADM

## 2018-04-30 RX ADMIN — MIDAZOLAM 2 MG: 1 INJECTION INTRAMUSCULAR; INTRAVENOUS at 14:05

## 2018-04-30 RX ADMIN — MIDAZOLAM 1 MG: 1 INJECTION INTRAMUSCULAR; INTRAVENOUS at 14:07

## 2018-04-30 RX ADMIN — MIDAZOLAM 1 MG: 1 INJECTION INTRAMUSCULAR; INTRAVENOUS at 14:08

## 2018-04-30 RX ADMIN — LIDOCAINE HYDROCHLORIDE 1 ML: 10 INJECTION, SOLUTION EPIDURAL; INFILTRATION; INTRACAUDAL; PERINEURAL at 13:04

## 2018-04-30 RX ADMIN — MIDAZOLAM 1 MG: 1 INJECTION INTRAMUSCULAR; INTRAVENOUS at 14:06

## 2018-04-30 RX ADMIN — FENTANYL CITRATE 50 MCG: 50 INJECTION, SOLUTION INTRAMUSCULAR; INTRAVENOUS at 14:05

## 2018-04-30 RX ADMIN — LIDOCAINE HYDROCHLORIDE 5 ML: 20 SOLUTION ORAL; TOPICAL at 14:03

## 2018-04-30 NOTE — CONSULTS
[facility] GI Pre-Procedure Physical Assessment    Sanam Morin MRN# [id number]   Age: 28 year old YOB: 1990      Date of Surgery: 2018  Location [facility]      Date of Exam 2018 Facility (Same day)       Primary care provider: Nusrat Rodriguez         Active problem list:   Patient Active Problem List   Diagnosis     GERD (gastroesophageal reflux disease)     Tobacco use disorder     Obesity (BMI 30-39.9)     Insomnia     Bipolar affective disorder (H)     Acne     S/P LEEP (status post loop electrosurgical excision procedure)     CARDIOVASCULAR SCREENING; LDL GOAL LESS THAN 160     S/P      Anxiety     Dysplasia of cervix     Menorrhagia            Medications (include herbals and vitamins):   Any Plavix use in the last 7 days?  No     Current Facility-Administered Medications   Medication     lidocaine (LMX4) kit     lidocaine 1 % 1 mL     ondansetron (ZOFRAN) injection 4 mg     sodium chloride (PF) 0.9% PF flush 3 mL     sodium chloride (PF) 0.9% PF flush 3 mL             Allergies:      Allergies   Allergen Reactions     Moxifloxacin      diahreaa     Percocet [Oxycodone-Acetaminophen] GI Disturbance     Wellbutrin [Bupropion Hcl] Other (See Comments)     Suicidal thoughts     Yellow Dye      Number 5  Hives and diahreaa     Accutane [Isotretinoin] Other (See Comments)     Suicidal thoughts  Suicidal ideation     Spironolactone Rash     Allergy to Latex?  No  Allergy to tape?    No          Social History:     Social History   Substance Use Topics     Smoking status: Current Every Day Smoker     Packs/day: 1.00     Years: 11.00     Types: Cigarettes     Smokeless tobacco: Never Used     Alcohol use 0.6 - 1.2 oz/week     1 - 2 Glasses of wine per week            Physical Exam:   All vitals have been reviewed  Blood pressure 112/76, pulse 74, temperature 98.6  F (37  C), temperature source Oral, resp. rate 18, last menstrual period 2018, SpO2  97 %, not currently breastfeeding.  Airway assessment:   Patient is able to stick out tongue      Lungs:   No increased work of breathing, good air exchange, clear to auscultation bilaterally, no crackles or wheezing      Cardiovascular:   Normal apical impulse, regular rate and rhythm, normal S1 and S2, no S3 or S4, and no murmur noted           Lab / Radiology Results:   All laboratory data reviewed          Assessment:   Appropriately NPO  Chief complaint or anatomic assessment of involved area: Epigastric pain         Plan:   Moderate (conscious) sedation     Risks, benefits, alternatives to sedation and blood explained and consent obtained  Risks, benefits, alternatives to procedure explained and consent obtained  Orders and progress notes are in the chart  Discharge from Phase 1 and / or Phase 2 recovery when patient meets criteria    I have reviewed the history and physical, lab finding(s), diagnostic data, medicaitons, and the plan for sedation.  I have determined this patient to be an appropriate candidate for the planned sedation / procedure and have reassessed the patient immediately prior to sedation / procedure.    I have personally and medically directed the administration of medications used.    Kaiser Bolden MD, MD

## 2018-04-30 NOTE — IP AVS SNAPSHOT
"                  MRN:[id number]                      After Visit Summary   4/30/2018    Sanam Morin    MRN: [id number]           Thank you!     Thank you for choosing [facility] for your care. Our goal is always to provide you with excellent care. Hearing back from our patients is one way we can continue to improve our services. Please take a few minutes to complete the written survey that you may receive in the mail after you visit with us. Thank you!        Patient Information     Date Of Birth          1990        About your hospital stay     You were admitted on:  April 30, 2018 You last received care in the:  [facility]    You were discharged on:  April 30, 2018       Who to Call     For medical emergencies, please call 911.  For non-urgent questions about your medical care, please call your primary care provider or clinic, [phone]  For questions related to your surgery, please call your surgery clinic        Attending Provider     Provider Specialty    Kaiser Bolden MD Gastroenterology       Primary Care Provider Office Phone # Fax #    Nusrat NELLIE Rodriguez [facility] [phone]      Pending Results     No orders found from 4/28/2018 to 5/1/2018.            Admission Information     Date & Time Provider Department Dept. Phone    4/30/2018 Kaiser Bolden MD [facility] Endoscopy [phone]      Your Vitals Were     Blood Pressure Pulse Temperature Respirations Last Period Pulse Oximetry    110/67 74 98.6  F (37  C) (Oral) 16 04/18/2018 97%      MyChart Information     MyChart lets you send messages to your doctor, view your test results, renew your prescriptions, schedule appointments and more. To sign up, go to [email or web address] . Click on \"Log in\" on the left side of the screen, which will take you to the Welcome page. Then click on \"Sign up Now\" on the right side of the page.     You will be asked to enter the access code listed below, as well as " some personal information. Please follow the directions to create your username and password.     Your access code is: [email or web address]  Expires: 7/3/2018 11:08 AM     Your access code will  in 90 days. If you need help or a new code, please call your [place] clinic or [phone].        Care EveryWhere ID     This is your Care EveryWhere ID. This could be used by other organizations to access your [place] medical records  [id number]        Equal Access to Services     DONNA [facility] Center[facility]ULIO : Hadii karrie ku hadasho Soomaali, waaxda luqadaha, qaybta kaalmada adeegyada, catherine lopesin haymargy james . So [facility] [phone].    ATENCIÓN: Si habla español, tiene a erickson disposición servicios gratuitos de asistencia lingüística. Llame al [phone].    We comply with applicable federal civil rights laws and [place] laws. We do not discriminate on the basis of race, color, national origin, age, disability, sex, sexual orientation, or gender identity.               Review of your medicines      UNREVIEWED medicines. Ask your doctor about these medicines        Dose / Directions    hydrOXYzine 25 MG tablet   Commonly known as:  ATARAX        TK 1 T PO TID PRN   Refills:  5       nicotine 7 MG/24HR 24 hr patch   Commonly known as:  NICODERM CQ   Used for:  Tobacco use disorder        Dose:  1 patch   Place 1 patch onto the skin every 24 hours   Quantity:  30 patch   Refills:  1       ranitidine 150 MG tablet   Commonly known as:  ZANTAC   Used for:  Abdominal pain, epigastric        Dose:  150 mg   Take 1 tablet (150 mg) by mouth 2 times daily   Quantity:  60 tablet   Refills:  1       RYAN-e 400 MG Tabs        Refills:  0                Protect others around you: Learn how to safely use, store and throw away your medicines at www.disposemymeds.org.             Medication List: This is a list of all your medications and when to take them. Check marks below indicate your daily home schedule. Keep this list as a  reference.      Medications           Morning Afternoon Evening Bedtime As Needed    hydrOXYzine 25 MG tablet   Commonly known as:  ATARAX   TK 1 T PO TID PRN                                nicotine 7 MG/24HR 24 hr patch   Commonly known as:  NICODERM CQ   Place 1 patch onto the skin every 24 hours                                ranitidine 150 MG tablet   Commonly known as:  ZANTAC   Take 1 tablet (150 mg) by mouth 2 times daily                                YRAN-e 400 MG Tabs

## 2018-04-30 NOTE — IP AVS SNAPSHOT
[facility] Endoscopy    [address] [address]    Phone:  [phone]                                       After Visit Summary   4/30/2018    Sanam Morin    MRN: [id number]           After Visit Summary Signature Page     I have received my discharge instructions, and my questions have been answered. I have discussed any challenges I see with this plan with the nurse or doctor.    ..........................................................................................................................................  Patient/Patient Representative Signature      ..........................................................................................................................................  Patient Representative Print Name and Relationship to Patient    ..................................................               ................................................  Date                                            Time    ..........................................................................................................................................  Reviewed by Signature/Title    ...................................................              ..............................................  Date                                                            Time

## 2018-06-04 DIAGNOSIS — F17.200 TOBACCO USE DISORDER: ICD-10-CM

## 2018-06-04 NOTE — TELEPHONE ENCOUNTER
Pt is requesting the higher dose patch. Julissa Kamara CMA (AAMA)  Nicotine Patch      Last Written Prescription Date:  4/24/2018  Last Fill Quantity: 30,   # refills: 1  Last Office Visit: 4/20/2018  Future Office visit:       Routing refill request to provider for review/approval because:  Pt wants an increase in dose

## 2018-06-05 RX ORDER — NICOTINE 21 MG/24HR
1 PATCH, TRANSDERMAL 24 HOURS TRANSDERMAL EVERY 24 HOURS
Qty: 30 PATCH | Refills: 2 | Status: SHIPPED | OUTPATIENT
Start: 2018-06-05

## 2018-10-03 DIAGNOSIS — F41.9 ANXIETY: Primary | ICD-10-CM

## 2018-10-04 NOTE — TELEPHONE ENCOUNTER
"Requested Prescriptions   Pending Prescriptions Disp Refills     hydrOXYzine (ATARAX) 25 MG tablet 120 tablet 5    Antihistamines Protocol Passed    10/3/2018  1:30 PM       Passed - Recent (12 mo) or future (30 days) visit within the authorizing provider's specialty    Patient had office visit in the last 12 months or has a visit in the next 30 days with authorizing provider or within the authorizing provider's specialty.  See \"Patient Info\" tab in inbasket, or \"Choose Columns\" in Meds & Orders section of the refill encounter.           Passed - Patient is age 3 or older    Apply age and/or weight-based dosing for peds patients age 3 and older.    Forward request to provider for patients under the age of 3.          Routing refill request to provider for review/approval because:  Medication is reported/historical  JOE BuenrostroN, RN            "

## 2018-10-05 RX ORDER — HYDROXYZINE HYDROCHLORIDE 25 MG/1
TABLET, FILM COATED ORAL
Qty: 30 TABLET | Refills: 0 | Status: SHIPPED | OUTPATIENT
Start: 2018-10-05 | End: 2019-03-26

## 2019-01-18 ENCOUNTER — OFFICE VISIT (OUTPATIENT)
Dept: FAMILY MEDICINE | Facility: CLINIC | Age: 29
End: 2019-01-18
Payer: MEDICARE

## 2019-01-18 ENCOUNTER — RESULT FOLLOW UP (OUTPATIENT)
Dept: FAMILY MEDICINE | Facility: CLINIC | Age: 29
End: 2019-01-18

## 2019-01-18 VITALS
RESPIRATION RATE: 22 BRPM | HEIGHT: 63 IN | OXYGEN SATURATION: 99 % | TEMPERATURE: 97.7 F | BODY MASS INDEX: 29.48 KG/M2 | SYSTOLIC BLOOD PRESSURE: 118 MMHG | HEART RATE: 102 BPM | WEIGHT: 166.4 LBS | DIASTOLIC BLOOD PRESSURE: 78 MMHG

## 2019-01-18 DIAGNOSIS — Z98.890 S/P LEEP (STATUS POST LOOP ELECTROSURGICAL EXCISION PROCEDURE): ICD-10-CM

## 2019-01-18 DIAGNOSIS — Z00.00 WELL ADULT EXAM: Primary | ICD-10-CM

## 2019-01-18 DIAGNOSIS — Z11.3 SCREEN FOR STD (SEXUALLY TRANSMITTED DISEASE): ICD-10-CM

## 2019-01-18 LAB
CHOLEST SERPL-MCNC: 263 MG/DL
GLUCOSE SERPL-MCNC: 96 MG/DL (ref 70–99)
HDLC SERPL-MCNC: 51 MG/DL
LDLC SERPL CALC-MCNC: 193 MG/DL
NONHDLC SERPL-MCNC: 212 MG/DL
SPECIMEN SOURCE: NORMAL
TRIGL SERPL-MCNC: 94 MG/DL
WET PREP SPEC: NORMAL

## 2019-01-18 PROCEDURE — G0476 HPV COMBO ASSAY CA SCREEN: HCPCS | Performed by: NURSE PRACTITIONER

## 2019-01-18 PROCEDURE — 87591 N.GONORRHOEAE DNA AMP PROB: CPT | Performed by: NURSE PRACTITIONER

## 2019-01-18 PROCEDURE — 36415 COLL VENOUS BLD VENIPUNCTURE: CPT | Performed by: NURSE PRACTITIONER

## 2019-01-18 PROCEDURE — 87491 CHLMYD TRACH DNA AMP PROBE: CPT | Performed by: NURSE PRACTITIONER

## 2019-01-18 PROCEDURE — 87389 HIV-1 AG W/HIV-1&-2 AB AG IA: CPT | Performed by: NURSE PRACTITIONER

## 2019-01-18 PROCEDURE — 99207 ZZC RSCC CODE FOR CODING REVIEW: CPT | Performed by: NURSE PRACTITIONER

## 2019-01-18 PROCEDURE — G0438 PPPS, INITIAL VISIT: HCPCS | Performed by: NURSE PRACTITIONER

## 2019-01-18 PROCEDURE — 87210 SMEAR WET MOUNT SALINE/INK: CPT | Performed by: NURSE PRACTITIONER

## 2019-01-18 PROCEDURE — 82947 ASSAY GLUCOSE BLOOD QUANT: CPT | Performed by: NURSE PRACTITIONER

## 2019-01-18 PROCEDURE — G0145 SCR C/V CYTO,THINLAYER,RESCR: HCPCS | Performed by: NURSE PRACTITIONER

## 2019-01-18 PROCEDURE — G0124 SCREEN C/V THIN LAYER BY MD: HCPCS | Performed by: NURSE PRACTITIONER

## 2019-01-18 PROCEDURE — 86780 TREPONEMA PALLIDUM: CPT | Performed by: NURSE PRACTITIONER

## 2019-01-18 PROCEDURE — 80061 LIPID PANEL: CPT | Performed by: NURSE PRACTITIONER

## 2019-01-18 PROCEDURE — 87624 HPV HI-RISK TYP POOLED RSLT: CPT | Performed by: NURSE PRACTITIONER

## 2019-01-18 RX ORDER — LAMOTRIGINE 200 MG/1
100 TABLET ORAL
COMMUNITY

## 2019-01-18 ASSESSMENT — MIFFLIN-ST. JEOR: SCORE: 1443.92

## 2019-01-18 ASSESSMENT — ENCOUNTER SYMPTOMS
PARESTHESIAS: 0
HEADACHES: 0
FEVER: 0
MYALGIAS: 0
COUGH: 0
WEAKNESS: 0
HEMATURIA: 0
BREAST MASS: 0
CONSTIPATION: 0
DIARRHEA: 0
CHILLS: 0
PALPITATIONS: 0
JOINT SWELLING: 0
DYSURIA: 0
EYE PAIN: 0
NAUSEA: 0
SORE THROAT: 0
SHORTNESS OF BREATH: 0
DIZZINESS: 0
HEMATOCHEZIA: 0
ARTHRALGIAS: 0
HEARTBURN: 0
NERVOUS/ANXIOUS: 0
ABDOMINAL PAIN: 0
FREQUENCY: 1

## 2019-01-18 ASSESSMENT — PATIENT HEALTH QUESTIONNAIRE - PHQ9
5. POOR APPETITE OR OVEREATING: MORE THAN HALF THE DAYS
SUM OF ALL RESPONSES TO PHQ QUESTIONS 1-9: 6

## 2019-01-18 ASSESSMENT — ANXIETY QUESTIONNAIRES
3. WORRYING TOO MUCH ABOUT DIFFERENT THINGS: SEVERAL DAYS
7. FEELING AFRAID AS IF SOMETHING AWFUL MIGHT HAPPEN: NOT AT ALL
5. BEING SO RESTLESS THAT IT IS HARD TO SIT STILL: NEARLY EVERY DAY
IF YOU CHECKED OFF ANY PROBLEMS ON THIS QUESTIONNAIRE, HOW DIFFICULT HAVE THESE PROBLEMS MADE IT FOR YOU TO DO YOUR WORK, TAKE CARE OF THINGS AT HOME, OR GET ALONG WITH OTHER PEOPLE: SOMEWHAT DIFFICULT
2. NOT BEING ABLE TO STOP OR CONTROL WORRYING: MORE THAN HALF THE DAYS
1. FEELING NERVOUS, ANXIOUS, OR ON EDGE: SEVERAL DAYS
GAD7 TOTAL SCORE: 11
6. BECOMING EASILY ANNOYED OR IRRITABLE: MORE THAN HALF THE DAYS

## 2019-01-18 ASSESSMENT — PAIN SCALES - GENERAL: PAINLEVEL: NO PAIN (0)
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
## 2019-01-18 NOTE — LETTER
January 22, 2019      Sanam Frenchreji  [address]   [place] [address]        Dear ,    We are writing to inform you of your test results.      The STD screen is completely negative.  Cholesterol is quite elevated, this is unusual, as it has been normal previously.  Please watch diet, limit intake of dietary fats and cholesterol.  We will recheck next year with your physical    Resulted Orders   NEISSERIA GONORRHOEA PCR   Result Value Ref Range    Specimen Descrip Urine     N Gonorrhea PCR Negative NEG^Negative      Comment:      Negative for N. gonorrhoeae rRNA by transcription mediated amplification.  A negative result by transcription mediated amplification does not preclude   the presence of N. gonorrhoeae infection because results are dependent on   proper and adequate collection, absence of inhibitors, and sufficient rRNA to   be detected.     CHLAMYDIA TRACHOMATIS PCR   Result Value Ref Range    Specimen Description Urine     Chlamydia Trachomatis PCR Negative NEG^Negative      Comment:      Negative for C. trachomatis rRNA by transcription mediated amplification.  A negative result by transcription mediated amplification does not preclude   the presence of C. trachomatis infection because results are dependent on   proper and adequate collection, absence of inhibitors, and sufficient rRNA to   be detected.     HIV Antigen Antibody Combo   Result Value Ref Range    HIV Antigen Antibody Combo Nonreactive NR^Nonreactive          Comment:      HIV-1 p24 Ag & HIV-1/HIV-2 Ab Not Detected   Treponema Abs w Reflex to RPR and Titer   Result Value Ref Range    Treponema Antibodies Nonreactive NR^Nonreactive   Lipid panel reflex to direct LDL Fasting   Result Value Ref Range    Cholesterol 263 (H) <200 mg/dL      Comment:      Desirable:       <200 mg/dl    Triglycerides 94 <150 mg/dL      Comment:      Fasting specimen    HDL Cholesterol 51 >49 mg/dL    LDL Cholesterol Calculated 193 (H)  <100 mg/dL      Comment:      Above desirable:  100-129 mg/dl  Borderline High:  130-159 mg/dL  High:             160-189 mg/dL  Very high:       >189 mg/dl      Non HDL Cholesterol 212 (H) <130 mg/dL      Comment:      Above Desirable:  130-159 mg/dl  Borderline high:  160-189 mg/dl  High:             190-219 mg/dl  Very high:       >219 mg/dl     Glucose   Result Value Ref Range    Glucose 96 70 - 99 mg/dL      Comment:      Fasting specimen   Wet prep   Result Value Ref Range    Specimen Description Vagina     Wet Prep No Trichomonas seen     Wet Prep No clue cells seen     Wet Prep No yeast seen     Wet Prep Few  PMNs seen          If you have any questions or concerns, please call the clinic at the number listed above.       Sincerely,        NELLIE Sage CNP

## 2019-01-18 NOTE — LETTER
November 24, 2020      Sanam Morin  [address]   [place] [address]        Dear ,    This letter is to remind you that you are due for your follow-up Pap smear and Human Papillomavirus (HPV) test.    Please call [phone] to schedule your appointment at your earliest convenience.    If you have completed the appointment outside of the [facility] system, please have the records forwarded to our office. We will update your chart for your provider to review before your next annual wellness visit.     Thank you for choosing [facility]!      Sincerely,    Your [facility] Care Team

## 2019-01-18 NOTE — LETTER
January 29, 2019    Sanam ROMULO Mikel  [address]   [place] [address]      Dear ,      This letter is in regards to your recent cervical cancer screening (Pap smear and HPV test).    Your Pap smear result was reported as ASCUS or Atypical Squamous Cells of Undetermined Significance. This means that there were mildly abnormal cells found in the sample that we collected from your cervix. The vast majority of patients with this result do not have significant cervical abnormalities.     Your cervical sample was also tested for the presence of Human Papillomavirus (HPV). Your HPV test is NEGATIVE for high risk HPV, meaning that no HPV was found at this time.     Over time, your body can get rid of these abnormal cells, so it is recommended that you repeat your Pap smear and HPV test in 1 year.    If you have additional questions regarding this result, please call our registered nurse, Cassandra at [phone].    Please continue to be seen every year for an annual physical exam and other preventative tests.         Sincerely,    NELLIE Sage CNP/rlm

## 2019-01-18 NOTE — PATIENT INSTRUCTIONS

## 2019-01-19 LAB
HIV 1+2 AB+HIV1 P24 AG SERPL QL IA: NONREACTIVE
T PALLIDUM AB SER QL: NONREACTIVE

## 2019-01-19 ASSESSMENT — ANXIETY QUESTIONNAIRES: GAD7 TOTAL SCORE: 11

## 2019-01-20 LAB
C TRACH DNA SPEC QL NAA+PROBE: NEGATIVE
N GONORRHOEA DNA SPEC QL NAA+PROBE: NEGATIVE
SPECIMEN SOURCE: NORMAL
SPECIMEN SOURCE: NORMAL

## 2019-01-24 LAB
COPATH REPORT: ABNORMAL
PAP: ABNORMAL

## 2019-01-25 LAB
FINAL DIAGNOSIS: NORMAL
HPV HR 12 DNA CVX QL NAA+PROBE: NEGATIVE
HPV16 DNA SPEC QL NAA+PROBE: NEGATIVE
HPV18 DNA SPEC QL NAA+PROBE: NEGATIVE
SPECIMEN DESCRIPTION: NORMAL
SPECIMEN SOURCE CVX/VAG CYTO: NORMAL

## 2019-01-29 NOTE — PROGRESS NOTES
8/20/13 pap ASC-H. Plan-- colposcopy within 3 months  10/16/13 pap NIL.  Plan-- pap in 1 year. (due 10/16/14)   5/28/14 pap NIL/+ HR HPV (16) plan--colp  8/11/14 colp. Pap ASCUS + HR HPV 16. bx- LSIL. ECC- cannot rule out HSIL. Plan-- conization LEEP  8/22/14 consult. Conization LEEP vs. Hysterectomy.    9/15/14 2nd opinion with Dr Jackson. In reminders  10/6/14 hysteroscopy/D&C/CKC.no dysplasia. Plan-repeat pap 3 months (1/6/15)  4/29/15 pap NIL/neg HPV. Plan: repeat pap and HPV testing in 6 months (due 10/29/15)   2/5/16 pap NIL/neg HR HPV. Plan: pap in 1 year. (fallon)  7/18/17 NIL Pap. Plan cotest in 2 years.  1/18/19 ASCUS pap, neg HPV.  Plan: cotest in 1 yr, per Nusrat Rodriguez, NELLIE  1/29/19 Result letter sent per RN request (rlm)  11/8/19 Pt notified by phone.  12/6/19 [facility]- ECC and cervical Bx = Normal. Plan 1 yr co-test. Pt notified. (LN)

## 2019-03-26 DIAGNOSIS — F41.9 ANXIETY: ICD-10-CM

## 2019-03-26 RX ORDER — HYDROXYZINE HYDROCHLORIDE 25 MG/1
TABLET, FILM COATED ORAL
Qty: 30 TABLET | Refills: 0 | Status: SHIPPED | OUTPATIENT
Start: 2019-03-26

## 2019-03-26 NOTE — TELEPHONE ENCOUNTER
"hydroxyzine  Last Written Prescription Date:  10/5/2018  Last Fill Quantity: 30,  # refills: 0   Last office visit: 1/18/2019 with prescribing provider:      Future Office Visit:   Next 5 appointments (look out 90 days)    Mar 27, 2019 10:30 AM CDT  Office Visit with Jas Johnson MD  [facility] ([facility]) [address] [place] [id number] [phone]           Requested Prescriptions   Pending Prescriptions Disp Refills     hydrOXYzine (ATARAX) 25 MG tablet 30 tablet 0     Sig: Take 1 tablet by mouth daily or as needed.    Antihistamines Protocol Passed - 3/26/2019  2:11 PM       Passed - Recent (12 mo) or future (30 days) visit within the authorizing provider's specialty    Patient had office visit in the last 12 months or has a visit in the next 30 days with authorizing provider or within the authorizing provider's specialty.  See \"Patient Info\" tab in inbasket, or \"Choose Columns\" in Meds & Orders section of the refill encounter.             Passed - Patient is age 3 or older    Apply age and/or weight-based dosing for peds patients age 3 and older.    Forward request to provider for patients under the age of 3.         Passed - Medication is active on med list          Prescription approved per [facility] Refill Protocol.  Bessie Ray RN on 3/26/2019 at 4:01 PM    "

## 2019-03-27 ENCOUNTER — TELEPHONE (OUTPATIENT)
Dept: FAMILY MEDICINE | Facility: CLINIC | Age: 29
End: 2019-03-27

## 2019-03-27 ENCOUNTER — OFFICE VISIT (OUTPATIENT)
Dept: FAMILY MEDICINE | Facility: CLINIC | Age: 29
End: 2019-03-27
Payer: MEDICARE

## 2019-03-27 VITALS
HEART RATE: 102 BPM | WEIGHT: 160.9 LBS | RESPIRATION RATE: 16 BRPM | OXYGEN SATURATION: 98 % | TEMPERATURE: 97.8 F | DIASTOLIC BLOOD PRESSURE: 68 MMHG | BODY MASS INDEX: 28.5 KG/M2 | SYSTOLIC BLOOD PRESSURE: 110 MMHG

## 2019-03-27 DIAGNOSIS — N92.1 MENORRHAGIA WITH IRREGULAR CYCLE: Primary | ICD-10-CM

## 2019-03-27 DIAGNOSIS — R10.2 PELVIC PAIN IN FEMALE: ICD-10-CM

## 2019-03-27 LAB
ERYTHROCYTE [DISTWIDTH] IN BLOOD BY AUTOMATED COUNT: 12.2 % (ref 10–15)
HCT VFR BLD AUTO: 42.2 % (ref 35–47)
HGB BLD-MCNC: 14.1 G/DL (ref 11.7–15.7)
MCH RBC QN AUTO: 32 PG (ref 26.5–33)
MCHC RBC AUTO-ENTMCNC: 33.4 G/DL (ref 31.5–36.5)
MCV RBC AUTO: 96 FL (ref 78–100)
PLATELET # BLD AUTO: 229 10E9/L (ref 150–450)
RBC # BLD AUTO: 4.4 10E12/L (ref 3.8–5.2)
WBC # BLD AUTO: 5.8 10E9/L (ref 4–11)

## 2019-03-27 PROCEDURE — 36415 COLL VENOUS BLD VENIPUNCTURE: CPT | Performed by: OBSTETRICS & GYNECOLOGY

## 2019-03-27 PROCEDURE — 88305 TISSUE EXAM BY PATHOLOGIST: CPT | Performed by: OBSTETRICS & GYNECOLOGY

## 2019-03-27 PROCEDURE — 58100 BIOPSY OF UTERUS LINING: CPT | Performed by: OBSTETRICS & GYNECOLOGY

## 2019-03-27 PROCEDURE — 85027 COMPLETE CBC AUTOMATED: CPT | Performed by: OBSTETRICS & GYNECOLOGY

## 2019-03-27 PROCEDURE — 99214 OFFICE O/P EST MOD 30 MIN: CPT | Mod: 25 | Performed by: OBSTETRICS & GYNECOLOGY

## 2019-03-27 ASSESSMENT — PAIN SCALES - GENERAL: PAINLEVEL: MILD PAIN (3)

## 2019-03-27 NOTE — TELEPHONE ENCOUNTER
Reason for Call: Request for an order or referral:    Order or referral being requested: labs- glucose, iron & pregnancy test     Date needed: as soon as possible    Has the patient been seen by the PCP for this problem? YES    Additional comments: Pt is seeing you at 1030. She will be in a hurry after the appt and would like to do these labs ahead of time. Please call.    Phone number Patient can be reached at:  Home number on file [phone] (home)    Best Time:  Any     Can we leave a detailed message on this number?  YES    Call taken on 3/27/2019 at 8:19 AM by Shannan Broussard

## 2019-03-27 NOTE — TELEPHONE ENCOUNTER
Per Dr. Johnson he needs to see the patient before ordering any labs.  Patient called and informed.  Patient had no further questions or concerns.  Kane Thompson MA

## 2019-03-27 NOTE — PROGRESS NOTES
SUBJECTIVE:                                                      Referral from Nusrat Rodriguez       Reason for consultation:   menorrhagia    History:  Sanam  Is a 29 year old female  7 para ( 1  and 1CS and 5 SABs )   who presents today because of menorrhagia.  She is soaking pads and bleeding for 8 days out of the month.  She has had previous conization of her cervix and has had hysteroscopy dilatation and curettage.  She is not interested in any more conservative management such as hormones.  In fact she smokes and therefore estrogen is relatively contraindicated.    She has severe cramping with her menses as well.  Last year she was seen at [facility] and was told that she was severely anemic and needed to take iron twice a day because of the heavy menses.         Last pap:  2019= ASCUS w negative HPV        Patient Active Problem List   Diagnosis     GERD (gastroesophageal reflux disease)     Tobacco use disorder     Obesity (BMI 30-39.9)     Insomnia     Bipolar affective disorder (H)     Acne     S/P LEEP (status post loop electrosurgical excision procedure)     CARDIOVASCULAR SCREENING; LDL GOAL LESS THAN 160     S/P      Anxiety     Dysplasia of cervix     Menorrhagia     Past Surgical History:   Procedure Laterality Date      SECTION, TUBAL LIGATION, COMBINED  2014    Procedure:  section and bilateral tubal ligation;  Surgeon: Mayank Ríos MD;  Location: [facility] L[facility]     CONIZATION N/A 10/6/2014    Procedure: CONIZATION;  Surgeon: Jas Johnson MD;  Location: [facility] OR     DILATION AND CURETTAGE, HYSTEROSCOPY DIAGNOSTIC, COMBINED N/A 10/6/2014    Procedure: COMBINED DILATION AND CURETTAGE, HYSTEROSCOPY DIAGNOSTIC;  Surgeon: Jas Johnson MD;  Location: [facility] OR     ESOPHAGOSCOPY, GASTROSCOPY, DUODENOSCOPY (EGD), COMBINED N/A 2018    Procedure: COMBINED ESOPHAGOSCOPY, GASTROSCOPY, DUODENOSCOPY (EGD);  ESOPHAGOSCOPY,  GASTROSCOPY, DUODENOSCOPY (EGD) ;  Surgeon: Kaiser Bolden MD;  Location:  GI     LASIK       MYRINGOTOMY, INSERT TUBE, COMBINED         Social History     Tobacco Use     Smoking status: Current Every Day Smoker     Packs/day: 1.00     Years: 11.00     Pack years: 11.00     Types: Cigarettes     Smokeless tobacco: Never Used   Substance Use Topics     Alcohol use: Yes     Alcohol/week: 0.6 - 1.2 oz     Types: 1 - 2 Glasses of wine per week     Family History   Problem Relation Age of Onset     Cardiovascular Maternal Grandfather         heart attack     Depression Mother      Psychotic Disorder Other         cousin has schizophrenia         Current Outpatient Medications   Medication Sig Dispense Refill     hydrOXYzine (ATARAX) 25 MG tablet Take 1 tablet by mouth daily or as needed. 30 tablet 0     lamoTRIgine (LAMICTAL) 200 MG tablet Take 100 mg by mouth       nicotine (NICODERM CQ) 21 MG/24HR 24 hr patch Place 1 patch onto the skin every 24 hours (Patient not taking: Reported on 3/27/2019) 30 patch 2     nicotine (NICODERM CQ) 7 MG/24HR 24 hr patch Place 1 patch onto the skin every 24 hours (Patient not taking: Reported on 3/27/2019) 30 patch 1     S-Adenosylmethionine (RYAN-E) 400 MG TABS          ROS:  A 12 point systems review is negative except for what is listed above in the Subjective history.            OBJECTIVE:                                                    Vital signs:  Blood pressure 110/68, pulse 102, temperature 97.8  F (36.6  C), temperature source Temporal, resp. rate 16, weight 73 kg (160 lb 14.4 oz), last menstrual period 03/16/2019, SpO2 98 %, not currently breastfeeding.    Repeat pulse is 80    HEENT is normal.      Neck is supple, mobile, no adenoapthy or masses palpable. Normal range of motion noted.     Chest is clear to auscultation. No wheezes, rales or rhonchi heard.  cardiac exam is normal with s1, s2, no murmurs or adventitious sounds.Normal rate and rhythm is heard.     Abdomen  is soft,  nondistended, No masses felt.No HSM. No guarding or rigidity or rebound noted. Palpation reveals  no    tenderness   Normal bowel sounds heard.     Pelvic exam:My nurse Bonita was present to chaperone the exam.    The external genitalia appeared normal.      The vaginal vault was without bleeding  or   discharge or odor.      The cervix was smooth and shiny and normal in appearance.      No vaginal support defects were noted,      Bimanual exam revealed a 8 week   sized uterus. It does not   descend  very well in the vaginal vault.perhaps somewhat but not well.     No adnexal masses were felt.      There was no  cervical motion tenderness.      Exam was  limited by the patient's body habitus.        With my nurse present, and after receiving informed consent from the patient, I performed the endometrial biopsy in the usual fashion using a standard pipelle. The pipelle sounded to 7 cm. I sent the biopsy for pathology. She tolerated the procedure well except felt syncopal for several minutes afterward.then recovered well.. She was instructed to call or present to clinic or ER if she has unusual amount of cramping, bleeding, fever or vaginal discharge.                 ASSESSMENT/PLAN:                                                        1.29 year old female  with Menorrhagia. Differential diagnosis would include:  A. thyroid dysfunction(hypothroid or hyperthroid)-    TSH test was denied by her insurance.  B. uterine leiomyomata(fibroids)- -will check pelvic US     C. endometrial hyperplasia -  await the endometrial biopsy results    D. anovulatory cycles, such as from PCO, stress, weight gain or loss, aging, etc  E.  With the menorrhagia, one must rule out anemia.- will check CBC          2. Pelvic pain- dysmenorrhae- Plan to recheck here in 1 week and discuss results of endometrial biopsy and complete blood count and ultrasound.  Options for management might include hysteroscopy D&C with Mirena IUD  versus endometrial ablation versus hysterectomy.  In light of the fact that she has severe cramping with her menses hysterectomy seems to make the most sense at this point but we will discuss further after seeing results of tests.                                                                      Thank you for this consultation.    Copy to  Nusrat Rodriguez MD  [facility]

## 2019-03-28 ENCOUNTER — HOSPITAL ENCOUNTER (OUTPATIENT)
Dept: ULTRASOUND IMAGING | Facility: CLINIC | Age: 29
Discharge: HOME | End: 2019-03-28
Attending: OBSTETRICS & GYNECOLOGY | Admitting: OBSTETRICS & GYNECOLOGY
Payer: MEDICARE

## 2019-03-28 DIAGNOSIS — N92.1 MENORRHAGIA WITH IRREGULAR CYCLE: ICD-10-CM

## 2019-03-28 PROCEDURE — 76830 TRANSVAGINAL US NON-OB: CPT

## 2019-03-29 LAB — COPATH REPORT: NORMAL

## 2019-04-03 ENCOUNTER — HOSPITAL ENCOUNTER (OUTPATIENT)
Facility: CLINIC | Age: 29
End: 2019-04-03
Attending: OBSTETRICS & GYNECOLOGY | Admitting: OBSTETRICS & GYNECOLOGY
Payer: COMMERCIAL

## 2019-04-03 ENCOUNTER — OFFICE VISIT (OUTPATIENT)
Dept: FAMILY MEDICINE | Facility: CLINIC | Age: 29
End: 2019-04-03
Payer: MEDICARE

## 2019-04-03 VITALS
DIASTOLIC BLOOD PRESSURE: 62 MMHG | WEIGHT: 161.7 LBS | SYSTOLIC BLOOD PRESSURE: 124 MMHG | TEMPERATURE: 97.5 F | OXYGEN SATURATION: 98 % | BODY MASS INDEX: 28.64 KG/M2 | HEART RATE: 110 BPM | RESPIRATION RATE: 14 BRPM

## 2019-04-03 DIAGNOSIS — N92.1 MENORRHAGIA WITH IRREGULAR CYCLE: ICD-10-CM

## 2019-04-03 DIAGNOSIS — N39.3 SUI (STRESS URINARY INCONTINENCE, FEMALE): ICD-10-CM

## 2019-04-03 DIAGNOSIS — R10.2 PELVIC PAIN IN FEMALE: Primary | ICD-10-CM

## 2019-04-03 PROCEDURE — 99214 OFFICE O/P EST MOD 30 MIN: CPT | Performed by: OBSTETRICS & GYNECOLOGY

## 2019-04-03 ASSESSMENT — PAIN SCALES - GENERAL: PAINLEVEL: NO PAIN (0)

## 2019-04-03 NOTE — PROGRESS NOTES
Subjective: Here to discuss results.    Recent US showed:     FINDINGS: Uterus is 8.6 x 3.8 x 5.2 cm in size Endometrial stripe 0.8  cm with a trace amount of fluid in the endometrial cavity. No  fibroids. There is subtle myometrial heterogeneity and a suggestion of  mild thickening of the anterior myometrium. Ovaries appear normal  bilaterally. No adnexal mass or free fluid.                                                                      IMPRESSION:  1. Small fluid in the endometrial cavity would be consistent with  endometrial biopsy yesterday.  2. Subtle heterogeneity and suggestion of mild anterior myometrial  endometrial thickening without discrete fibroid. Possibility of  adenomyosis is raised which would be better evaluated with pelvic MRI  if clinically indicated.     FLORENCE ROCHA MD    The endometrial biopsy was benign    Complete blood count was normal.    The past medical history, social history, past surgical history and family history as shown below have been reviewed by me today.    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     Moderate major depression (H) 6/28/2011     Pap smear of cervix with ASCUS, cannot exclude HGSIL 8/20/13     Postpartum depression     major post partum depression.      S/P LASIK surgery         Allergies   Allergen Reactions     Moxifloxacin      diahreaa     Percocet [Oxycodone-Acetaminophen] GI Disturbance     Wellbutrin [Bupropion Hcl] Other (See Comments)     Suicidal thoughts     Yellow Dye      Number 5  Hives and diahreaa     Accutane [Isotretinoin] Other (See Comments)     Suicidal thoughts  Suicidal ideation     Spironolactone Rash     Current Outpatient Medications   Medication Sig Dispense Refill     lamoTRIgine (LAMICTAL) 200 MG tablet Take 100 mg by mouth       S-Adenosylmethionine (RYAN-E) 400  MG TABS        hydrOXYzine (ATARAX) 25 MG tablet Take 1 tablet by mouth daily or as needed. (Patient not taking: Reported on 4/3/2019) 30 tablet 0     nicotine (NICODERM CQ) 21 MG/24HR 24 hr patch Place 1 patch onto the skin every 24 hours (Patient not taking: Reported on 3/27/2019) 30 patch 2     nicotine (NICODERM CQ) 7 MG/24HR 24 hr patch Place 1 patch onto the skin every 24 hours (Patient not taking: Reported on 3/27/2019) 30 patch 1     Past Surgical History:   Procedure Laterality Date      SECTION, TUBAL LIGATION, COMBINED  2014    Procedure:  section and bilateral tubal ligation;  Surgeon: Mayank Ríos MD;  [facility]     CONIZATION N/A 10/6/2014    Procedure: CONIZATION;  Surgeon: Jas Johnson MD;  Location: [facility]     DILATION AND CURETTAGE, HYSTEROSCOPY DIAGNOSTIC, COMBINED N/A 10/6/2014    Procedure: COMBINED DILATION AND CURETTAGE, HYSTEROSCOPY DIAGNOSTIC;  Surgeon: Jas Johnson MD;  Location[facility]     ESOPHAGOSCOPY, GASTROSCOPY, DUODENOSCOPY (EGD), COMBINED N/A 2018    Procedure: COMBINED ESOPHAGOSCOPY, GASTROSCOPY, DUODENOSCOPY (EGD);  ESOPHAGOSCOPY, GASTROSCOPY, DUODENOSCOPY (EGD) ;  Surgeon: Kaiser Bolden MD;  Location: [facility]     LASIK       MYRINGOTOMY, INSERT TUBE, COMBINED       Social History     Socioeconomic History     Marital status: Single     Spouse name: None     Number of children: None     Years of education: None     Highest education level: None   Occupational History     Comment: [employer]   Social Needs     Financial resource strain: None     Food insecurity:     Worry: None     Inability: None     Transportation needs:     Medical: None     Non-medical: None   Tobacco Use     Smoking status: Current Every Day Smoker     Packs/day: 1.00     Years: 11.00     Pack years: 11.00     Types: Cigarettes     Smokeless tobacco: Never Used   Substance and Sexual Activity     Alcohol use: Yes     Alcohol/week: 0.6 - 1.2  oz     Types: 1 - 2 Glasses of wine per week     Drug use: No     Comment: meth 7 yrs ago,  smokes pot 1-2 every month      Sexual activity: Yes     Partners: Male     Birth control/protection: Surgical   Lifestyle     Physical activity:     Days per week: None     Minutes per session: None     Stress: None   Relationships     Social connections:     Talks on phone: None     Gets together: None     Attends Buddhist service: None     Active member of club or organization: None     Attends meetings of clubs or organizations: None     Relationship status: None     Intimate partner violence:     Fear of current or ex partner: None     Emotionally abused: None     Physically abused: None     Forced sexual activity: None   Other Topics Concern      Service No     Blood Transfusions No     Caffeine Concern Yes     Comment: Advised not more than 200 mg/day     Occupational Exposure No     Hobby Hazards Not Asked     Sleep Concern No     Stress Concern Yes     Comment: hx of miscarriages     Weight Concern Yes     Comment: can't lose weight.      Special Diet No     Back Care No     Exercise Yes     Comment: Once she has approval from Dr. Ríos, advised  walking or low-impact exercise 30 minutes/day     Bike Helmet No     Seat Belt Yes     Self-Exams Yes     Parent/sibling w/ CABG, MI or angioplasty before 65F 55M? Not Asked   Social History Narrative    Pt lives in [place] with her significant other Azar and daughter, Tereso and his daughter visits qo w/e.  Sanam is quitting smoking.  No concerns about domestic violence.  Has 2 indoor cats.  Advised about toxoplasmosis precautions.      Family History   Problem Relation Age of Onset     Cardiovascular Maternal Grandfather         heart attack     Depression Mother      Psychotic Disorder Other         cousin has schizophrenia       ROS: A 12 point review of systems was done. Except for what is listed above in the HPI, the systems review is negative .       Objective: Vital signs: Blood pressure 124/62, pulse 110, temperature 97.5  F (36.4  C), temperature source Temporal, resp. rate 14, weight 73.3 kg (161 lb 11.2 oz), last menstrual period 03/16/2019, SpO2 98 %, not currently breastfeeding.    No exam is repeated today      Assessment/Plan: A total of 25 minutes were spent face-to-face with this patient during today's consultation, with more than 50% of that time devoted to conversation and counseling about the management decisions.      1.  29-year-old female with menorrhagia and severe pelvic pain with menses.  We discussed the options of treatment and I do not feel anything is going to take care of the pain other than hysterectomy.  IUD and ablation would be options just for the menorrhagia but the pain is fairly debilitating and therefore she would prefer the hysterectomy.  She has already had a tubal ligation so she does not need a sterilization permit    2.  She does have urinary stress incontinence.  I will refer her to Dr. Childress to consider whether a mid urethral sling would be indicated at the time of surgery.    3.  She will return to see me in June to sign consents.  I gave her some information to read over about hysterectomy.    4.  I have offered her a second opinion and she has declined.         The above information was dictating using Dragon voice software and as a result there may be some irregularities that were not detected in my review of this note.    JULIO Johnson MD

## 2019-04-19 ENCOUNTER — TELEPHONE (OUTPATIENT)
Dept: FAMILY MEDICINE | Facility: CLINIC | Age: 29
End: 2019-04-19

## 2019-04-19 NOTE — TELEPHONE ENCOUNTER
"Sanam Morin is a 29 year old female who calls with concerns of cramping.  She states she recently had a \"biopsy\" on 3/27 and is worried that she has an infection.  Sanam states she has had cramping ever since but today its worse. She just had her period that ended last Wednesday.  Has some white discharge but denies bleeding today.     NURSING ASSESSMENT:  Description:  cramping  Onset/duration:  Ongoing-worse today  Precip. factors:  Hx of bx on 3/27  Associated symptoms:   Denies bleeding, fever, body aches.  Improves/worsens symptoms:  None.  Pain scale (0-10)   6/10  LMP/preg/breast feeding:     Last exam/Treatment:     Allergies:   Allergies   Allergen Reactions     Moxifloxacin      diahreaa     Percocet [Oxycodone-Acetaminophen] GI Disturbance     Wellbutrin [Bupropion Hcl] Other (See Comments)     Suicidal thoughts     Yellow Dye      Number 5  Hives and diahreaa     Accutane [Isotretinoin] Other (See Comments)     Suicidal thoughts  Suicidal ideation     Spironolactone Rash       RECOMMENDED DISPOSITION:  Home care advice - Advised to take ibuprofen 600 mg three times a day for a week and try heating pad today.  If worsens needs to be seen.  Will comply with recommendation: Yes  If further questions/concerns or if symptoms do not improve, worsen or new symptoms develop, call your PCP or [facility] as soon as possible.      Guideline used:  Telephone Triage Protocols for Nurses, Fifth Edition, Yandy Ray RN    "

## 2019-04-22 ENCOUNTER — TRANSFERRED RECORDS (OUTPATIENT)
Dept: HEALTH INFORMATION MANAGEMENT | Facility: CLINIC | Age: 29
End: 2019-04-22

## 2019-04-25 ENCOUNTER — TELEPHONE (OUTPATIENT)
Dept: OBGYN | Facility: CLINIC | Age: 29
End: 2019-04-25

## 2019-04-25 DIAGNOSIS — Z01.812 PRE-PROCEDURAL LABORATORY EXAMINATION: ICD-10-CM

## 2019-04-25 DIAGNOSIS — Z01.812 PRE-PROCEDURE LAB EXAM: Primary | ICD-10-CM

## 2019-04-25 DIAGNOSIS — N92.1 MENORRHAGIA WITH IRREGULAR CYCLE: ICD-10-CM

## 2019-04-25 DIAGNOSIS — Z32.00 ENCOUNTER FOR PREGNANCY TEST: ICD-10-CM

## 2019-04-25 DIAGNOSIS — N94.89 OTHER SPECIFIED CONDITIONS ASSOCIATED WITH FEMALE GENITAL ORGANS AND MENSTRUAL CYCLE: ICD-10-CM

## 2019-04-25 NOTE — TELEPHONE ENCOUNTER
Type of surgery: HYSTERECTOMY, TOTAL, LAPAROSCOPIC and SALPINGECTOMY BILATERAL  Location of surgery: [facility]  Date and time of surgery: 6/24/19@7:30am  Surgeon: Dr. Johnson. With Renato Assisting  Pre-Op Appt Date: 6/12/19  Post-Op Appt Date: 6/27/19   Packet sent out: Packet will be given to patient at consent signing with Dr. Johnson  Pre-cert/Authorization completed:  Not Applicable  Date: 4/25/19  Kane Thompson MA      
electronic

## 2019-10-30 ENCOUNTER — OFFICE VISIT (OUTPATIENT)
Dept: FAMILY MEDICINE | Facility: CLINIC | Age: 29
End: 2019-10-30
Payer: MEDICARE

## 2019-10-30 VITALS
TEMPERATURE: 98.8 F | BODY MASS INDEX: 28.01 KG/M2 | RESPIRATION RATE: 12 BRPM | SYSTOLIC BLOOD PRESSURE: 92 MMHG | DIASTOLIC BLOOD PRESSURE: 58 MMHG | HEART RATE: 82 BPM | WEIGHT: 158.1 LBS | OXYGEN SATURATION: 100 %

## 2019-10-30 DIAGNOSIS — N92.1 MENORRHAGIA WITH IRREGULAR CYCLE: Primary | ICD-10-CM

## 2019-10-30 DIAGNOSIS — N87.9 DYSPLASIA OF CERVIX: ICD-10-CM

## 2019-10-30 DIAGNOSIS — N76.0 BV (BACTERIAL VAGINOSIS): ICD-10-CM

## 2019-10-30 DIAGNOSIS — Z01.411 ENCOUNTER FOR GYNECOLOGICAL EXAMINATION (GENERAL) (ROUTINE) WITH ABNORMAL FINDINGS: ICD-10-CM

## 2019-10-30 DIAGNOSIS — B96.89 BV (BACTERIAL VAGINOSIS): ICD-10-CM

## 2019-10-30 LAB
SPECIMEN SOURCE: ABNORMAL
WET PREP SPEC: ABNORMAL

## 2019-10-30 PROCEDURE — 99214 OFFICE O/P EST MOD 30 MIN: CPT | Mod: 25 | Performed by: OBSTETRICS & GYNECOLOGY

## 2019-10-30 PROCEDURE — 88305 TISSUE EXAM BY PATHOLOGIST: CPT | Performed by: OBSTETRICS & GYNECOLOGY

## 2019-10-30 PROCEDURE — G0476 HPV COMBO ASSAY CA SCREEN: HCPCS | Performed by: OBSTETRICS & GYNECOLOGY

## 2019-10-30 PROCEDURE — 87624 HPV HI-RISK TYP POOLED RSLT: CPT | Performed by: OBSTETRICS & GYNECOLOGY

## 2019-10-30 PROCEDURE — 87210 SMEAR WET MOUNT SALINE/INK: CPT | Performed by: OBSTETRICS & GYNECOLOGY

## 2019-10-30 PROCEDURE — 87491 CHLMYD TRACH DNA AMP PROBE: CPT | Performed by: OBSTETRICS & GYNECOLOGY

## 2019-10-30 PROCEDURE — 87591 N.GONORRHOEAE DNA AMP PROB: CPT | Performed by: OBSTETRICS & GYNECOLOGY

## 2019-10-30 PROCEDURE — 88305 TISSUE EXAM BY PATHOLOGIST: CPT | Mod: 26 | Performed by: OBSTETRICS & GYNECOLOGY

## 2019-10-30 PROCEDURE — G0145 SCR C/V CYTO,THINLAYER,RESCR: HCPCS | Performed by: OBSTETRICS & GYNECOLOGY

## 2019-10-30 PROCEDURE — 58100 BIOPSY OF UTERUS LINING: CPT | Performed by: OBSTETRICS & GYNECOLOGY

## 2019-10-30 RX ORDER — METRONIDAZOLE 500 MG/1
500 TABLET ORAL 2 TIMES DAILY
Qty: 14 TABLET | Refills: 0 | Status: SHIPPED | OUTPATIENT
Start: 2019-10-30 | End: 2019-12-06

## 2019-10-30 RX ORDER — FERROUS SULFATE 325(65) MG
325 TABLET ORAL
COMMUNITY

## 2019-10-30 ASSESSMENT — PAIN SCALES - GENERAL: PAINLEVEL: MILD PAIN (3)

## 2019-10-30 NOTE — RESULT ENCOUNTER NOTE
Bonita/Kane Borjas,Please inform Sanam/ or caretaker  that this result(s) is/are showing some evidence that she may have bacterial vaginosis.  She should take metronidazole twice daily.  She cannot drink alcohol while taking this or it will make her sick.  Scription was sent to this pharmacy.  Take it with food for 1 week.  Thanks. JULIO Johnson MD

## 2019-10-30 NOTE — PROGRESS NOTES
Subjective: she is concerned about spotting in mid cycle-  Also wants pap and STD testing- gen probe and wet prep.  She has been with a new partner over the past 3 months.  Declines HIV or hepatitis C or VDRL.  She only wants the vaginal testing.  She is worried because she has this midcycle spotting.  Also would just like reassurance that she has no STDs but does not want the expanded testing.  She had a tubal ligation with her last  in .      The past medical history, social history, past surgical history and family history as shown below have been reviewed by me today.    Past Medical History:   Diagnosis Date     Abnormal Pap smear     during pregnancy     H/O colposcopy with cervical biopsy 14    LSIL on biopsy     High risk HPV infection 14    papNIL/+ HR HPV 16.     Moderate major depression (H) 2011     Pap smear of cervix with ASCUS, cannot exclude HGSIL 13     Postpartum depression     major post partum depression.      S/P LASIK surgery         Allergies   Allergen Reactions     Moxifloxacin      diahreaa     Percocet [Oxycodone-Acetaminophen] GI Disturbance     Wellbutrin [Bupropion Hcl] Other (See Comments)     Suicidal thoughts     Yellow Dye      Number 5  Hives and diahreaa     Accutane [Isotretinoin] Other (See Comments)     Suicidal thoughts  Suicidal ideation     Spironolactone Rash     Current Outpatient Medications   Medication Sig Dispense Refill     ferrous sulfate (FEROSUL) 325 (65 Fe) MG tablet Take 325 mg by mouth daily (with breakfast)       hydrOXYzine (ATARAX) 25 MG tablet Take 1 tablet by mouth daily or as needed. 30 tablet 0     lamoTRIgine (LAMICTAL) 200 MG tablet Take 100 mg by mouth       nicotine (NICODERM CQ) 21 MG/24HR 24 hr patch Place 1 patch onto the skin every 24 hours (Patient not taking: Reported on 3/27/2019) 30 patch 2     nicotine (NICODERM CQ) 7 MG/24HR 24 hr patch Place 1 patch onto the skin every 24 hours (Patient not taking: Reported  on 3/27/2019) 30 patch 1     S-Adenosylmethionine (RYAN-E) 400 MG TABS        Past Surgical History:   Procedure Laterality Date      SECTION, TUBAL LIGATION, COMBINED  2014    Procedure:  section and bilateral tubal ligation;  Surgeon: Mayank Ríos MD;  [facility]     CONIZATION N/A 10/6/2014    Procedure: CONIZATION;  Surgeon: Jas Johnson MD;  [facility]     DILATION AND CURETTAGE, HYSTEROSCOPY DIAGNOSTIC, COMBINED N/A 10/6/2014    Procedure: COMBINED DILATION AND CURETTAGE, HYSTEROSCOPY DIAGNOSTIC;  Surgeon: Jas Johnson MD;  [facility]     ESOPHAGOSCOPY, GASTROSCOPY, DUODENOSCOPY (EGD), COMBINED N/A 2018    Procedure: COMBINED ESOPHAGOSCOPY, GASTROSCOPY, DUODENOSCOPY (EGD);  ESOPHAGOSCOPY, GASTROSCOPY, DUODENOSCOPY (EGD) ;  Surgeon: Kaiser Bolden MD;  [facility]     LASIK       MYRINGOTOMY, INSERT TUBE, COMBINED       Social History     Socioeconomic History     Marital status: Single     Spouse name: None     Number of children: None     Years of education: None     Highest education level: None   Occupational History     Comment: [employer]   Social Needs     Financial resource strain: None     Food insecurity:     Worry: None     Inability: None     Transportation needs:     Medical: None     Non-medical: None   Tobacco Use     Smoking status: Current Every Day Smoker     Packs/day: 1.00     Years: 11.00     Pack years: 11.00     Types: Cigarettes     Smokeless tobacco: Never Used   Substance and Sexual Activity     Alcohol use: Yes     Alcohol/week: 1.0 - 2.0 standard drinks     Types: 1 - 2 Glasses of wine per week     Drug use: No     Comment: meth 7 yrs ago,  smokes pot 1-2 every month      Sexual activity: Yes     Partners: Male     Birth control/protection: Surgical   Lifestyle     Physical activity:     Days per week: None     Minutes per session: None     Stress: None   Relationships     Social connections:      Talks on phone: None     Gets together: None     Attends Rastafarian service: None     Active member of club or organization: None     Attends meetings of clubs or organizations: None     Relationship status: None     Intimate partner violence:     Fear of current or ex partner: None     Emotionally abused: None     Physically abused: None     Forced sexual activity: None   Other Topics Concern      Service No     Blood Transfusions No     Caffeine Concern Yes     Comment: Advised not more than 200 mg/day     Occupational Exposure No     Hobby Hazards Not Asked     Sleep Concern No     Stress Concern Yes     Comment: hx of miscarriages     Weight Concern Yes     Comment: can't lose weight.      Special Diet No     Back Care No     Exercise Yes     Comment: Once she has approval from Dr. Ríos, advised  walking or low-impact exercise 30 minutes/day     Bike Helmet No     Seat Belt Yes     Self-Exams Yes     Parent/sibling w/ CABG, MI or angioplasty before 65F 55M? Not Asked   Social History Narrative    Pt lives in [place] with her significant other Azar and daughter, Tereso and his daughter visits qo w/e.  Sanam is quitting smoking.  No concerns about domestic violence.  Has 2 indoor cats.  Advised about toxoplasmosis precautions.      Family History   Problem Relation Age of Onset     Cardiovascular Maternal Grandfather         heart attack     Depression Mother      Psychotic Disorder Other         cousin has schizophrenia       ROS: A 12 point review of systems was done. Except for what is listed above in the HPI, the systems review is negative .      Objective: Vital signs: Blood pressure 92/58, pulse 82, temperature 98.8  F (37.1  C), temperature source Temporal, resp. rate 12, weight 71.7 kg (158 lb 1.6 oz), last menstrual period 10/15/2019, SpO2 100 %, not currently breastfeeding.    Chest is clear to auscultation.  No wheezes, rales or rhonchi heard.  Cardiac exam is normal with s1, s2, no murmurs  or adventitious sounds.Normal rate and rhythm is heard.   Abdomen is soft,  nondistended, No masses felt.No HSM. No guarding or rigidity or rebound   noted. Palpation reveals  no    tenderness   Normal bowel sounds heard.   Pelvic exam:My nurse Bonita  was present to chaperone the exam.  The external genitalia appeared normal.    The vaginal vault was without bleeding  or   discharge   or odor.   The cervix was smooth   and shiny and normal in appearance.    A pap was obtained.   A gen probe   was obtained.   A wet prep was obtained.   No vaginal support defects were noted,    Bimanual exam revealed a  6 week   sized uterus. It does  descend somewhat well in the vaginal vault.    No adnexal masses were felt.    There was no  cervical motion tenderness.    Exam was NOT  limited by the patient's body habitus.      With my nurse present, and after receiving informed consent from the patient, I performed the endometrial biopsy in the usual fashion using a standard pipelle. The pipelle sounded to  6 cm.I passed the pipelle 1 time(s)  The tissue retrieved was adequate    I sent the biopsy for pathology. She tolerated the procedure well. She was instructed to call or present to clinic or ER if she has unusual amount of cramping, bleeding, fever or vaginal discharge.      Assessment/Plan:      1.  Cervical dysplasia: 29-year-old female with history of cervical dysplasia who is due for Pap smear.  This was obtained.  I will contact her with results.    2.  Menorrhagia with irregular menses-she is worried about midcycle spotting and so I did obtain an endometrial biopsy and some STD testing.  I reassured her that midcycle spotting is fairly normal.  However we will contact her with the results of our testing.         The above information was dictating using Dragon voice software and as a result there may be some irregularities that were not detected in my review of this note.    JULIO Johnson MD

## 2019-11-01 NOTE — RESULT ENCOUNTER NOTE
Bonita/Kane Borjas,Please inform Sanam/ or caretaker  that this result(s) is/are normal.  GEN probe for chlamydia and gonorrhea is negative -thanks. JULIO Johnson MD

## 2019-11-04 LAB
COPATH REPORT: NORMAL
COPATH REPORT: NORMAL
PAP: NORMAL

## 2019-11-05 LAB
FINAL DIAGNOSIS: ABNORMAL
HPV HR 12 DNA CVX QL NAA+PROBE: POSITIVE
HPV16 DNA SPEC QL NAA+PROBE: NEGATIVE
HPV18 DNA SPEC QL NAA+PROBE: NEGATIVE
SPECIMEN DESCRIPTION: ABNORMAL
SPECIMEN SOURCE CVX/VAG CYTO: ABNORMAL

## 2019-12-04 ENCOUNTER — TELEPHONE (OUTPATIENT)
Dept: FAMILY MEDICINE | Facility: CLINIC | Age: 29
End: 2019-12-04

## 2019-12-04 DIAGNOSIS — R87.619 ABNORMAL CERVICAL PAPANICOLAOU SMEAR, UNSPECIFIED ABNORMAL PAP FINDING: Primary | ICD-10-CM

## 2019-12-04 NOTE — TELEPHONE ENCOUNTER
Called patient and she stated that she would like medication to calm her nerves before her procedure on Friday she stated that she will have a  and pharmacy is already placed.  Being [facility] in [place], [place].    Mariama Rodrigues CMA

## 2019-12-06 ENCOUNTER — OFFICE VISIT (OUTPATIENT)
Dept: FAMILY MEDICINE | Facility: CLINIC | Age: 29
End: 2019-12-06
Payer: MEDICARE

## 2019-12-06 VITALS
BODY MASS INDEX: 27.1 KG/M2 | WEIGHT: 153 LBS | SYSTOLIC BLOOD PRESSURE: 110 MMHG | RESPIRATION RATE: 16 BRPM | OXYGEN SATURATION: 96 % | DIASTOLIC BLOOD PRESSURE: 66 MMHG | TEMPERATURE: 98.5 F | HEART RATE: 125 BPM

## 2019-12-06 DIAGNOSIS — Z87.42 HX OF ABNORMAL CERVICAL PAP SMEAR: ICD-10-CM

## 2019-12-06 DIAGNOSIS — F17.200 TOBACCO DEPENDENCE: ICD-10-CM

## 2019-12-06 DIAGNOSIS — R87.810 CERVICAL HIGH RISK HPV (HUMAN PAPILLOMAVIRUS) TEST POSITIVE: Primary | ICD-10-CM

## 2019-12-06 DIAGNOSIS — Z98.890 HISTORY OF CONIZATION OF CERVIX: ICD-10-CM

## 2019-12-06 LAB — HCG UR QL: NEGATIVE

## 2019-12-06 PROCEDURE — 88305 TISSUE EXAM BY PATHOLOGIST: CPT | Performed by: FAMILY MEDICINE

## 2019-12-06 PROCEDURE — 88305 TISSUE EXAM BY PATHOLOGIST: CPT | Mod: 26 | Performed by: FAMILY MEDICINE

## 2019-12-06 PROCEDURE — 57454 BX/CURETT OF CERVIX W/SCOPE: CPT | Performed by: FAMILY MEDICINE

## 2019-12-06 PROCEDURE — 81025 URINE PREGNANCY TEST: CPT | Performed by: FAMILY MEDICINE

## 2019-12-06 RX ORDER — LORAZEPAM 0.5 MG/1
TABLET ORAL
Qty: 3 TABLET | Refills: 0 | Status: SHIPPED | OUTPATIENT
Start: 2019-12-06 | End: 2019-12-06

## 2019-12-06 RX ORDER — LORAZEPAM 0.5 MG/1
TABLET ORAL
Qty: 3 TABLET | Refills: 0 | Status: SHIPPED | OUTPATIENT
Start: 2019-12-06

## 2019-12-06 ASSESSMENT — PAIN SCALES - GENERAL: PAINLEVEL: NO PAIN (0)

## 2019-12-06 NOTE — PROGRESS NOTES
Colposcopy     Sanam is here today for colposcopy with possible cervical and endocervical biopsy.  Her last pap smear last month showed normal pap with positive for high risk HPV.  She was recommended for colposcopy with possible cervical and endocervical biopsy.  Has history of abnormal pap and high risk HPV -with history of LEEP. Has had multiple colposcopies if the past.  Stated that she was well informed by the nursing staff about the procedure and has no concern about it today.  No vaginal discharge.  Patient's last menstrual period was 2019 (exact date). No other concern today.  Multiple follow up endocervical curettage were negative/normal.     13  ASC-H pap. Plan-- colposcopy within 3 months   10/16/13 NIL pap.  Plan-- pap in 1 year. (due 10/16/14)    14  NIL pap, + HR HPV (16) plan--colp   14 colp: LSIL. ECC: can't r/o HSIL. Pap: ASCUS + HPV 16. Plan: LEEP   14 consult. [facility] LEEP vs. Hysterectomy.     9/15/14 2nd opinion with Dr Jackson.    10/6/14 hysteroscopy/D&C/CKC.no dysplasia. Repeat pap 3 months (1/6/15).   4/29/15 NIL pap, neg HR HPV. Repeat pap and HPV in 6 months (due 10/29/15).   16  NIL pap, neg HR HPV. Plan: pap in 1 year.    17 NIL Pap. Plan cotest in 2 years.    19 ASCUS pap, neg HPV.  Plan: cotest in 1 yr   10/30/19 NIL pap, + HR HPV (not 16 or 18). Plan: pending provider.     Previous history of abnormal paps?: yes  History of cryotherapy (freezing)?: yes - conization  History of veneral diseases: no  Do you desire testing for any of these diseases? no  History of genital warts:  no  Visible warts now?  no   Type of contraception: tubal ligation  History of sexual abuse:  Denied  Personal Hx of Cancer? NO  Family Hx of Cancer? NO  CAMRON Exposure?  NO  Previous Hysterectomy?  No  Other Gyn Surgery? LEEP,  and tubal ligation  Partner(s) with warts?: No    Referring Physician:  Nusrat Rodriguez  Reason for Colposcopy: high risk HPV with history  of abnormal pap  Marital status:  single  Number of pregnancies:  5         Children:   2  Patient's last menstrual period was Patient's last menstrual period was 2019 (exact date).  Abnormal bleeding? No  Abnormal discharge? No  Types of contraception (lifetime): tubal ligation  Smoker:  yes    Problem list and histories reviewed & adjusted, as indicated.  Additional history: as documented    Past Medical History:   Diagnosis Date     Abnormal Pap smear     during pregnancy     H/O colposcopy with cervical biopsy 14    LSIL on biopsy     High risk HPV infection 14    papNIL/+ HR HPV 16.     Moderate major depression (H) 2011     Pap smear of cervix with ASCUS, cannot exclude HGSIL 13     Postpartum depression     major post partum depression.      S/P LASIK surgery        Past Surgical History:   Procedure Laterality Date      SECTION, TUBAL LIGATION, COMBINED  2014    Procedure:  section and bilateral tubal ligation;  Surgeon: Mayank Ríos MD;  Location: [facility]     CONIZATION N/A 10/6/2014    Procedure: CONIZATION;  Surgeon: Jas Johnson MD;  Location: [facility] OR     DILATION AND CURETTAGE, HYSTEROSCOPY DIAGNOSTIC, COMBINED N/A 10/6/2014    Procedure: COMBINED DILATION AND CURETTAGE, HYSTEROSCOPY DIAGNOSTIC;  Surgeon: Jas Johnson MD;  Location:  OR     ESOPHAGOSCOPY, GASTROSCOPY, DUODENOSCOPY (EGD), COMBINED N/A 2018    Procedure: COMBINED ESOPHAGOSCOPY, GASTROSCOPY, DUODENOSCOPY (EGD);  ESOPHAGOSCOPY, GASTROSCOPY, DUODENOSCOPY (EGD) ;  Surgeon: Kaiser Bolden MD;  Location:  GI     [facility]       MYRINGOTOMY, INSERT TUBE, COMBINED          Current Outpatient Medications   Medication Sig Dispense Refill     hydrOXYzine (ATARAX) 25 MG tablet Take 1 tablet by mouth daily or as needed. 30 tablet 0     lamoTRIgine (LAMICTAL) 200 MG tablet Take 100 mg by mouth       LORazepam (ATIVAN) 0.5 MG tablet Take 1-2 tablets 30 minutes before  procedure as needed as instructed.  Do not operate any type of machine while taking this medication 3 tablet 0     ferrous sulfate (FEROSUL) 325 (65 Fe) MG tablet Take 325 mg by mouth daily (with breakfast)       nicotine (NICODERM CQ) 21 MG/24HR 24 hr patch Place 1 patch onto the skin every 24 hours (Patient not taking: Reported on 3/27/2019) 30 patch 2     nicotine (NICODERM CQ) 7 MG/24HR 24 hr patch Place 1 patch onto the skin every 24 hours (Patient not taking: Reported on 3/27/2019) 30 patch 1     S-Adenosylmethionine (RYAN-E) 400 MG TABS          Social History     Socioeconomic History     Marital status: Single     Spouse name: Not on file     Number of children: Not on file     Years of education: Not on file     Highest education level: Not on file   Occupational History     Comment: [employer]   Social Needs     Financial resource strain: Not on file     Food insecurity:     Worry: Not on file     Inability: Not on file     Transportation needs:     Medical: Not on file     Non-medical: Not on file   Tobacco Use     Smoking status: Current Every Day Smoker     Packs/day: 1.00     Years: 11.00     Pack years: 11.00     Types: Cigarettes     Smokeless tobacco: Never Used   Substance and Sexual Activity     Alcohol use: Yes     Alcohol/week: 1.0 - 2.0 standard drinks     Types: 1 - 2 Glasses of wine per week     Drug use: No     Comment: meth 7 yrs ago,  smokes pot 1-2 every month      Sexual activity: Yes     Partners: Male     Birth control/protection: Surgical   Lifestyle     Physical activity:     Days per week: Not on file     Minutes per session: Not on file     Stress: Not on file   Relationships     Social connections:     Talks on phone: Not on file     Gets together: Not on file     Attends [employer] service: Not on file     Active member of club or organization: Not on file     Attends meetings of clubs or organizations: Not on file     Relationship status: Not on file     Intimate partner  violence:     Fear of current or ex partner: Not on file     Emotionally abused: Not on file     Physically abused: Not on file     Forced sexual activity: Not on file   Other Topics Concern      Service No     Blood Transfusions No     Caffeine Concern Yes     Comment: Advised not more than 200 mg/day     Occupational Exposure No     Hobby Hazards Not Asked     Sleep Concern No     Stress Concern Yes     Comment: hx of miscarriages     Weight Concern Yes     Comment: can't lose weight.      Special Diet No     Back Care No     Exercise Yes     Comment: Once she has approval from Dr. Ríos, advised  walking or low-impact exercise 30 minutes/day     Bike Helmet No     Seat Belt Yes     Self-Exams Yes     Parent/sibling w/ CABG, MI or angioplasty before 65F 55M? Not Asked   Social History Narrative    Pt lives in [place] with her significant other Azar and daughter, Tereso and his daughter visits qo w/e.  Sanam is quitting smoking.  No concerns about domestic violence.  Has 2 indoor cats.  Advised about toxoplasmosis precautions.           Allergies   Allergen Reactions     Moxifloxacin      diahreaa     Percocet [Oxycodone-Acetaminophen] GI Disturbance     Wellbutrin [Bupropion Hcl] Other (See Comments)     Suicidal thoughts     Yellow Dye      Number 5  Hives and diahreaa     Accutane [Isotretinoin] Other (See Comments)     Suicidal thoughts  Suicidal ideation     Spironolactone Rash       ROS:  Constitutional, HEENT, cardiovascular, pulmonary, gi and gu systems are negative, except as otherwise noted.    OBJECTIVE:                                                      /66   Pulse 125   Temp 98.5  F (36.9  C)   Resp 16   Wt 69.4 kg (153 lb)   LMP 12/06/2019 (Exact Date)   SpO2 96%   BMI 27.10 kg/m      GENERAL: healthy, alert and no distress - very anxious  RESP: lungs clear to auscultation - no rales, rhonchi or wheezes  CV: regular rate and rhythm, no murmur.   (female): normal female  "external genitalia, vaginal mucosa.      Diagnostic Test Results:    Results for orders placed or performed in visit on 12/06/19   HCG Qual, Urine ([id number])     Status: None   Result Value Ref Range    HCG Qual Urine Negative NEG^Negative   Surgical pathology exam     Status: None   Result Value Ref Range    Copath Report       Patient Name: ANTONELLA NAIK  MR#: [id number]  Specimen #: [id number]  Collected: 12/6/2019  Received: 12/9/2019  Reported: 12/11/2019 14:57  Ordering Phy(s): MAE MEDRANO MAI    For improved result formatting, select 'View Enhanced Report Format' under   Linked Documents section.    SPECIMEN(S):  A: Cervical biopsy, 5 o'clock  B: Endocervical curettings    FINAL DIAGNOSIS:  A.  Cervix at 5:00, biopsy:  Cervix from transformation zone with:  - Focal squamous metaplasia with focal atypia.  - Normal mature squamous epithelium.  - Normal endocervical glandular epithelium.    B.  Endocervix, curettings:  - Multiple fragments of normal endocervical tissue.  - Multiple fragments of metaplastic and normal mature squamous epithelium.    Electronically signed out by:    Jenaro Porter M.D.    CLINICAL HISTORY:  29 year old female.  NIL pap smear [id number] and positive high risk HPV,   not 16 or 18 on 10/30/2019.    GROSS:  A: The specimen is received in formalin with proper patient identificatio n   labeled \"cervical biopsy at 5:00\".  The specimen consists of three pink rubbery tissue fragments ranging from   0.2 cm up to 0.6 x 0.3 x 0.2 cm.  The specimen is entirely submitted in one cassette.    B: The specimen is received in formalin with proper patient identification   labeled \"endocervical curettings\".  The specimen consists of red hemorrhagic material and mucinous material   measuring up to 0.4 cm in aggregate.  The specimen is filtered over lens paper.  The specimen is entirely   submitted in one cassette. (Dictated by:  Alonso Abdalla 12/9/2019 10:33 AM)    MICROSCOPIC:  Microscopic examination " is performed.  Multiple deeper tissue levels are   also examined microscopically on  specimen A.    The technical component of this testing was completed at the [facility], with the professional component performed   at the [facility], [address] [place] [id number] ([phone])    CPT Codes:  A: 62114-AQ3  B: 01158-IG4    COLLECTION SITE:  Client: [facility]  Location: [facility] (P)            ASSESSMENT/PLAN:                                                        ICD-10-CM    1. Cervical high risk HPV (human papillomavirus) test positive R87.810 HCG Qual, Urine ([id number])     Surgical pathology exam   2. Hx of abnormal cervical Pap smear Z87.42    3. History of conization of cervix Z98.890    4. Tobacco dependence F17.200      Recent pap showed normal with high risk HPV.  She has a history of abnormal pap history of conization.  Her endometrial and endocervical curette biopsies have been normal.  Last colposcopy with cervical biopsies in 2014 showed LGSIL at 11 o'clock position.  I reviewed the medical record closely and discussed with her about the significance of the findings.  I agreed with the recommendations of colposcopy with possible cervical biopsy.  Discussed with her in details colposcopy procedure and please see the procedural note for further details.  Also discussed about post procedural cares.  She was also educated about symptoms to call in or be seen.  All of her questions were answered.    Sylvie Becker Mai, MD  [facility]

## 2019-12-11 LAB — COPATH REPORT: NORMAL

## 2019-12-15 PROBLEM — R87.810 CERVICAL HIGH RISK HPV (HUMAN PAPILLOMAVIRUS) TEST POSITIVE: Status: ACTIVE | Noted: 2019-12-15

## 2019-12-15 PROBLEM — Z87.42 HX OF ABNORMAL CERVICAL PAP SMEAR: Status: ACTIVE | Noted: 2019-12-15

## 2019-12-15 PROBLEM — F17.200 TOBACCO DEPENDENCE: Status: ACTIVE | Noted: 2018-06-26

## 2019-12-15 NOTE — PROCEDURES
Procedure note:    Indication:  Normal pap with positive high risk HPV. Positive history of abnormal pap    Before the procedure, it was ensured that the patient was educated regarding the nature of her findings to date, the implications of them, and what was to be done. She has been made aware of the role of HPV, the natural history of infection, ways to minimize her future risk, the effect of HPV on the cervix, and treatment options available should they be indicated. The details of the colposcopic procedure were reviewed, as well as the risks of missed diagnoses, pain, infection and bleeding. All questions were answered before proceeding, and informed consent was therefore obtained.      Time out performed.  Patient identified times three.  Name of procedure and location of the procedure to be done also identfied.    Patient was placed in a lithotomy position. The perenium was examed under the microscope with and without the green filter and it was normal.  No suspicious lesion noted.  A sterile speculum was then inserted in a usual manner and the cervix was fully visualized in 360 degrees.  The cervix was the exam under the microscope with and without the green filter, which showed lesions prominent vascularization at 5 o'clock position - no other abnormality identified. No bleeding.  Vinegar solution applied to the cervix for 4 minutes. No aceto white noted. Lugal solution applied with normal iodine uptake through out.  Biopsy performed in a usual manner at 5 o'clock position with no complication. Endocervical biopsy with curette performed and she tolerated these procedures well.  Pending for pathology. Munsil solution applied directly into the cervix and bleeding was minimal.  Patient toterated the procedure well. Post procedure care discussed and explained in details.  Instructed her with no tampon, intercourse or douching for 2 weeks.  Educate symptoms to call in or to follow up, including fever, severe  abdominal, heavy bleeding or if she has any concern.  Tylenol or Ibuprofen as needed for pain or cramping.  Call in if has any questions      Bimanual examination: was not done  Pap repeated?:  No  SCJ seen?:  yes  Endocervical speculum needed?:  No  ECC done?:  Yes   Lugol's solution used?:  Yes   Satisfactory examination?:  yes    Vaginal vault: normal to cursory inspection   Urethra normal?:  yes  Labia normal?:  yes  Perineum normal?:  yes    FINDINGS:  Cervix: no concerning findings except of the prominent vessels at 5 o'clock position   Procedure: biopsies taken (not including ECC): 1.    Procedure summary: Patient tolerated procedure well     Assessment: normal or CATERINA 1    Plan: Specimens labelled and sent to pathology. Further treatment base on pathology findings.  Post biopsy instructions discussed and call to discuss pathology results once they are available.    Sylvie Gordon MD.

## 2019-12-16 ASSESSMENT — ANXIETY QUESTIONNAIRES
5. BEING SO RESTLESS THAT IT IS HARD TO SIT STILL: NOT AT ALL
IF YOU CHECKED OFF ANY PROBLEMS ON THIS QUESTIONNAIRE, HOW DIFFICULT HAVE THESE PROBLEMS MADE IT FOR YOU TO DO YOUR WORK, TAKE CARE OF THINGS AT HOME, OR GET ALONG WITH OTHER PEOPLE: SOMEWHAT DIFFICULT
GAD7 TOTAL SCORE: 3
6. BECOMING EASILY ANNOYED OR IRRITABLE: SEVERAL DAYS
3. WORRYING TOO MUCH ABOUT DIFFERENT THINGS: SEVERAL DAYS
2. NOT BEING ABLE TO STOP OR CONTROL WORRYING: NOT AT ALL
7. FEELING AFRAID AS IF SOMETHING AWFUL MIGHT HAPPEN: NOT AT ALL
1. FEELING NERVOUS, ANXIOUS, OR ON EDGE: SEVERAL DAYS

## 2019-12-16 ASSESSMENT — PATIENT HEALTH QUESTIONNAIRE - PHQ9
5. POOR APPETITE OR OVEREATING: NOT AT ALL
SUM OF ALL RESPONSES TO PHQ QUESTIONS 1-9: 5

## 2019-12-17 ASSESSMENT — ANXIETY QUESTIONNAIRES: GAD7 TOTAL SCORE: 3

## 2020-08-13 DIAGNOSIS — F10.10 ALCOHOL ABUSE, CONTINUOUS: ICD-10-CM

## 2020-08-13 DIAGNOSIS — F31.81 SEVERE BIPOLAR II DISORDER, MOST RECENT EPISODE MAJOR DEPRESSIVE WITHOUT PSYCHOTIC FEATURES (H): Primary | ICD-10-CM

## 2020-08-13 DIAGNOSIS — F31.81 BIPOLAR II DISORDER, MODERATE, DEPRESSED, WITH ANXIOUS DISTRESS (H): ICD-10-CM

## 2020-08-13 DIAGNOSIS — F34.1 DYSTHYMIC DISORDER: ICD-10-CM

## 2020-08-13 DIAGNOSIS — Z51.81 ENCOUNTER FOR THERAPEUTIC DRUG MONITORING: ICD-10-CM

## 2020-08-13 DIAGNOSIS — Z79.899 ENCOUNTER FOR LONG-TERM (CURRENT) USE OF OTHER MEDICATIONS: ICD-10-CM

## 2020-08-13 DIAGNOSIS — F31.81: ICD-10-CM

## 2020-08-13 LAB
ALBUMIN SERPL-MCNC: 3.8 G/DL (ref 3.4–5)
ALP SERPL-CCNC: 35 U/L (ref 40–150)
ALT SERPL W P-5'-P-CCNC: 14 U/L (ref 0–50)
ANION GAP SERPL CALCULATED.3IONS-SCNC: 7 MMOL/L (ref 3–14)
AST SERPL W P-5'-P-CCNC: 6 U/L (ref 0–45)
BILIRUB DIRECT SERPL-MCNC: <0.1 MG/DL (ref 0–0.2)
BILIRUB SERPL-MCNC: 0.3 MG/DL (ref 0.2–1.3)
BUN SERPL-MCNC: 10 MG/DL (ref 7–30)
CALCIUM SERPL-MCNC: 9.2 MG/DL (ref 8.5–10.1)
CHLORIDE SERPL-SCNC: 113 MMOL/L (ref 94–109)
CO2 SERPL-SCNC: 22 MMOL/L (ref 20–32)
CREAT SERPL-MCNC: 0.8 MG/DL (ref 0.52–1.04)
ERYTHROCYTE [DISTWIDTH] IN BLOOD BY AUTOMATED COUNT: 11.7 % (ref 10–15)
GFR SERPL CREATININE-BSD FRML MDRD: >90 ML/MIN/{1.73_M2}
GLUCOSE SERPL-MCNC: 87 MG/DL (ref 70–99)
HCT VFR BLD AUTO: 42.7 % (ref 35–47)
HGB BLD-MCNC: 14.6 G/DL (ref 11.7–15.7)
IRON SATN MFR SERPL: 35 % (ref 15–46)
IRON SERPL-MCNC: 104 UG/DL (ref 35–180)
MCH RBC QN AUTO: 32.4 PG (ref 26.5–33)
MCHC RBC AUTO-ENTMCNC: 34.2 G/DL (ref 31.5–36.5)
MCV RBC AUTO: 95 FL (ref 78–100)
PLATELET # BLD AUTO: 252 10E9/L (ref 150–450)
POTASSIUM SERPL-SCNC: 3.7 MMOL/L (ref 3.4–5.3)
PROT SERPL-MCNC: 7 G/DL (ref 6.8–8.8)
RBC # BLD AUTO: 4.51 10E12/L (ref 3.8–5.2)
SODIUM SERPL-SCNC: 142 MMOL/L (ref 133–144)
T4 FREE SERPL-MCNC: 1.02 NG/DL (ref 0.76–1.46)
TIBC SERPL-MCNC: 298 UG/DL (ref 240–430)
TSH SERPL DL<=0.005 MIU/L-ACNC: 0.54 MU/L (ref 0.4–4)
WBC # BLD AUTO: 7.5 10E9/L (ref 4–11)

## 2020-08-13 PROCEDURE — 83550 IRON BINDING TEST: CPT | Mod: GA | Performed by: NURSE PRACTITIONER

## 2020-08-13 PROCEDURE — 80053 COMPREHEN METABOLIC PANEL: CPT | Performed by: NURSE PRACTITIONER

## 2020-08-13 PROCEDURE — 84443 ASSAY THYROID STIM HORMONE: CPT | Performed by: NURSE PRACTITIONER

## 2020-08-13 PROCEDURE — 36415 COLL VENOUS BLD VENIPUNCTURE: CPT | Performed by: NURSE PRACTITIONER

## 2020-08-13 PROCEDURE — 83540 ASSAY OF IRON: CPT | Performed by: NURSE PRACTITIONER

## 2020-08-13 PROCEDURE — 85027 COMPLETE CBC AUTOMATED: CPT | Performed by: NURSE PRACTITIONER

## 2020-08-13 PROCEDURE — 82306 VITAMIN D 25 HYDROXY: CPT | Performed by: NURSE PRACTITIONER

## 2020-08-13 PROCEDURE — 82248 BILIRUBIN DIRECT: CPT | Performed by: NURSE PRACTITIONER

## 2020-08-13 PROCEDURE — 84439 ASSAY OF FREE THYROXINE: CPT | Performed by: NURSE PRACTITIONER

## 2020-08-14 LAB — DEPRECATED CALCIDIOL+CALCIFEROL SERPL-MC: 39 UG/L (ref 20–75)

## (undated) RX ORDER — FENTANYL CITRATE 50 UG/ML
INJECTION, SOLUTION INTRAMUSCULAR; INTRAVENOUS
Status: DISPENSED
Start: 2018-04-30